# Patient Record
Sex: FEMALE | Race: WHITE | NOT HISPANIC OR LATINO | Employment: OTHER | URBAN - METROPOLITAN AREA
[De-identification: names, ages, dates, MRNs, and addresses within clinical notes are randomized per-mention and may not be internally consistent; named-entity substitution may affect disease eponyms.]

---

## 2017-01-30 ENCOUNTER — OFFICE VISIT (OUTPATIENT)
Dept: INTERNAL MEDICINE | Facility: CLINIC | Age: 77
End: 2017-01-30
Payer: MEDICARE

## 2017-01-30 VITALS
HEART RATE: 77 BPM | SYSTOLIC BLOOD PRESSURE: 166 MMHG | BODY MASS INDEX: 33.28 KG/M2 | OXYGEN SATURATION: 97 % | DIASTOLIC BLOOD PRESSURE: 80 MMHG | WEIGHT: 219.56 LBS | HEIGHT: 68 IN | TEMPERATURE: 97 F

## 2017-01-30 DIAGNOSIS — Z12.31 SCREENING MAMMOGRAM, ENCOUNTER FOR: Primary | ICD-10-CM

## 2017-01-30 DIAGNOSIS — J01.90 ACUTE SINUSITIS, RECURRENCE NOT SPECIFIED, UNSPECIFIED LOCATION: Primary | ICD-10-CM

## 2017-01-30 PROCEDURE — 99214 OFFICE O/P EST MOD 30 MIN: CPT | Mod: 25,S$GLB,, | Performed by: NURSE PRACTITIONER

## 2017-01-30 PROCEDURE — 99999 PR PBB SHADOW E&M-EST. PATIENT-LVL III: CPT | Mod: PBBFAC,,, | Performed by: NURSE PRACTITIONER

## 2017-01-30 PROCEDURE — 96372 THER/PROPH/DIAG INJ SC/IM: CPT | Mod: S$GLB,,, | Performed by: NURSE PRACTITIONER

## 2017-01-30 RX ORDER — METHYLPREDNISOLONE ACETATE 40 MG/ML
40 INJECTION, SUSPENSION INTRA-ARTICULAR; INTRALESIONAL; INTRAMUSCULAR; SOFT TISSUE
Status: COMPLETED | OUTPATIENT
Start: 2017-01-30 | End: 2017-01-30

## 2017-01-30 RX ORDER — DOXYCYCLINE 100 MG/1
100 CAPSULE ORAL 2 TIMES DAILY
Qty: 14 CAPSULE | Refills: 0 | Status: SHIPPED | OUTPATIENT
Start: 2017-01-30 | End: 2017-02-06

## 2017-01-30 RX ADMIN — METHYLPREDNISOLONE ACETATE 40 MG: 40 INJECTION, SUSPENSION INTRA-ARTICULAR; INTRALESIONAL; INTRAMUSCULAR; SOFT TISSUE at 12:01

## 2017-01-30 NOTE — PROGRESS NOTES
Subjective:       Patient ID: Grazyna Mccoy is a 76 y.o. female.    Chief Complaint: Cough and Nasal Congestion    Cough   This is a new problem. The current episode started 1 to 4 weeks ago. The problem has been gradually worsening. The problem occurs every few minutes. The cough is productive of sputum. Associated symptoms include chills, ear pain (bilateral), headaches, nasal congestion, rhinorrhea and a sore throat. Pertinent negatives include no fever, myalgias, rash or shortness of breath. Nothing aggravates the symptoms. Treatments tried: essential oils. The treatment provided mild relief.     Review of Systems   Constitutional: Positive for chills. Negative for fever.   HENT: Positive for congestion, ear pain (bilateral), rhinorrhea, sinus pressure and sore throat.    Respiratory: Positive for cough. Negative for shortness of breath.    Musculoskeletal: Negative for myalgias.   Skin: Negative for rash.   Neurological: Positive for headaches.       Objective:      Physical Exam   Constitutional: She is oriented to person, place, and time. She appears well-developed and well-nourished. No distress.   HENT:   Head: Normocephalic and atraumatic.   Right Ear: External ear normal.   Left Ear: External ear normal.   Mouth/Throat: Oropharynx is clear and moist.   Beefy red appearance to nasal mucosa with yellow discharge.   Eyes: Conjunctivae and EOM are normal. Pupils are equal, round, and reactive to light.   Neck: Normal range of motion. Neck supple.   Cardiovascular: Normal rate and regular rhythm.  Exam reveals no gallop and no friction rub.    No murmur heard.  Pulmonary/Chest: Effort normal and breath sounds normal. No respiratory distress. She has no wheezes. She has no rales.   Lymphadenopathy:     She has no cervical adenopathy.   Neurological: She is alert and oriented to person, place, and time.   Skin: Skin is warm and dry. No rash noted.   Vitals reviewed.      Assessment:       1. Acute sinusitis,  recurrence not specified, unspecified location        Plan:   Acute sinusitis, recurrence not specified, unspecified location  -     doxycycline (VIBRAMYCIN) 100 MG Cap; Take 1 capsule (100 mg total) by mouth 2 (two) times daily.  Dispense: 14 capsule; Refill: 0  -     methylPREDNISolone acetate injection 40 mg; Inject 1 mL (40 mg total) into the muscle one time.      Return if symptoms worsen or fail to improve, for keep routine follow up.

## 2017-02-08 ENCOUNTER — HOSPITAL ENCOUNTER (OUTPATIENT)
Dept: RADIOLOGY | Facility: HOSPITAL | Age: 77
Discharge: HOME | End: 2017-02-08
Attending: NURSE PRACTITIONER
Payer: MEDICARE

## 2017-02-08 DIAGNOSIS — Z12.31 SCREENING MAMMOGRAM, ENCOUNTER FOR: ICD-10-CM

## 2017-02-08 PROCEDURE — 77067 SCR MAMMO BI INCL CAD: CPT | Mod: TC

## 2017-02-08 PROCEDURE — 77063 BREAST TOMOSYNTHESIS BI: CPT | Mod: 26,,, | Performed by: RADIOLOGY

## 2017-02-08 PROCEDURE — 77067 SCR MAMMO BI INCL CAD: CPT | Mod: 26,,, | Performed by: RADIOLOGY

## 2017-03-07 DIAGNOSIS — E11.9 TYPE II OR UNSPECIFIED TYPE DIABETES MELLITUS WITHOUT MENTION OF COMPLICATION, NOT STATED AS UNCONTROLLED: ICD-10-CM

## 2017-03-10 DIAGNOSIS — E11.9 TYPE 2 DIABETES MELLITUS WITHOUT COMPLICATION: ICD-10-CM

## 2017-03-10 NOTE — TELEPHONE ENCOUNTER
----- Message from Jimi Hollins sent at 3/10/2017 12:43 PM CST -----  Contact: Taran RUDOLPH [phone]  Please call Taran regarding pt's new prescription. They are requesting it sent via e-scribe as they are unclear as to what script needs processing. Please call back @ [phone]. Thank you/NH

## 2017-04-13 ENCOUNTER — OFFICE VISIT (OUTPATIENT)
Dept: INTERNAL MEDICINE | Facility: CLINIC | Age: 77
End: 2017-04-13
Payer: MEDICARE

## 2017-04-13 ENCOUNTER — HOSPITAL ENCOUNTER (OUTPATIENT)
Dept: RADIOLOGY | Facility: HOSPITAL | Age: 77
Discharge: HOME | End: 2017-04-13
Attending: FAMILY MEDICINE
Payer: MEDICARE

## 2017-04-13 VITALS
OXYGEN SATURATION: 97 % | HEART RATE: 70 BPM | SYSTOLIC BLOOD PRESSURE: 130 MMHG | BODY MASS INDEX: 34.12 KG/M2 | WEIGHT: 217.38 LBS | HEIGHT: 67 IN | DIASTOLIC BLOOD PRESSURE: 78 MMHG | TEMPERATURE: 98 F

## 2017-04-13 DIAGNOSIS — R05.3 PERSISTENT COUGH FOR 3 WEEKS OR LONGER: ICD-10-CM

## 2017-04-13 DIAGNOSIS — J32.8 OTHER CHRONIC SINUSITIS: Primary | ICD-10-CM

## 2017-04-13 PROCEDURE — 99214 OFFICE O/P EST MOD 30 MIN: CPT | Mod: S$GLB,,, | Performed by: FAMILY MEDICINE

## 2017-04-13 PROCEDURE — 71020 XR CHEST PA AND LATERAL: CPT | Mod: TC

## 2017-04-13 PROCEDURE — 99999 PR PBB SHADOW E&M-EST. PATIENT-LVL III: CPT | Mod: PBBFAC,,, | Performed by: FAMILY MEDICINE

## 2017-04-13 PROCEDURE — 71020 XR CHEST PA AND LATERAL: CPT | Mod: 26,,, | Performed by: RADIOLOGY

## 2017-04-13 NOTE — MR AVS SNAPSHOT
[facility]  [address] [facility] [address]  Phone: [phone]  Fax: [phone]                  Grazyna Mccoy   2017 11:40 AM   Office Visit    Description:  Female : 1940   Provider:  Shobha Swift MD   Department:  [facility] - Internal Medicine           Reason for Visit     Eye Problem     Nasal Congestion     Tinnitus           Diagnoses this Visit        Comments    Other chronic sinusitis    -  Primary     Persistent cough for 3 weeks or longer                To Do List           Future Appointments        Provider Department Dept Phone    2017 1:15 PM ON XR1- [facility] [phone]    2017 10:20 AM Evie Sifuentes PA-C [facility] - Otohinolaryngology [phone]    2017 9:40 AM [facility] [phone]    2017 10:45 AM MARCELO Marx OD [facility] - Ophthalmology [phone]      Goals (5 Years of Data)     None      Follow-Up and Disposition     Return if symptoms worsen or fail to improve, for keep routine follow up.      [facility]     [facility] Nurse Care Line -  Assistance  Unless otherwise directed by your provider, please contact [facility], our nurse care line that is available for  assistance.     Registered nurses in the [facility] provide: appointment scheduling, clinical advisement, health education, and other advisory services.  Call: [phone] (toll free)               Medications           Message regarding Medications     Verify the changes and/or additions to your medication regime listed below are the same as discussed with your clinician today.  If any of these changes or additions are incorrect, please notify your healthcare provider.        STOP taking these medications     cyanocobalamin (VITAMIN B-12) 1000 MCG tablet Take 1 tablet (1,000 mcg total) by mouth once daily.           Verify that the below list  "of medications is an accurate representation of the medications you are currently taking.  If none reported, the list may be blank. If incorrect, please contact your healthcare provider. Carry this list with you in case of emergency.           Current Medications     aspirin 81 MG Chew Take by mouth. 1 tablet, chewable Oral Every day    cholecalciferol, vitamin D3, (VITAMIN D3) 1,000 unit capsule Take 1 capsule (1,000 Units total) by mouth once daily.    hydrochlorothiazide (HYDRODIURIL) 25 MG tablet TAKE ONE TABLET BY MOUTH EVERY DAY    lorazepam (ATIVAN) 0.5 MG tablet Take by mouth. 2 tablet Oral Every day    losartan (COZAAR) 50 MG tablet Take 50 mg by mouth once daily.    ONETOUCH DELICA LANCETS 33 gauge Misc CHECK BLOOD SUGAR ONCE DAILY    ONETOUCH ULTRA TEST Strp USE TO TEST TWICE A DAY    sertraline (ZOLOFT) 100 MG tablet Take 150 mg by mouth once daily.     mirtazapine (REMERON) 15 MG tablet Take by mouth. 1 tablet Oral At bedtime           Clinical Reference Information           Your Vitals Were     BP Pulse Temp Height Weight SpO2    130/78 (BP Location: Left arm, Patient Position: Sitting, BP Method: Manual) 70 97.8 °F (36.6 °C) 5' 7" (1.702 m) 98.6 kg (217 lb 6 oz) 97%    BMI                34.05 kg/m2          Blood Pressure          Most Recent Value    BP  130/78      Allergies as of 4/13/2017     Ciprofloxacin    Codeine    Septra  [Sulfamethoxazole-trimethoprim]      Immunizations Administered on Date of Encounter - 4/13/2017     None      Orders Placed During Today's Visit      Normal Orders This Visit    Ambulatory referral to ENT     Future Labs/Procedures Expected by Expires    X-Ray Chest PA And Lateral  4/13/2017 4/13/2018      Language Assistance Services     ATTENTION: Language assistance services are available, free of charge. Please call [phone].      ATENCIÓN: Si arlinela jim, tiene a alonzo disposición servicios gratuitos de asistencia lingüística. Llame al [phone].     [facility] " Ý: N?u b?n nói Ti?ng Vi?t, có các d?ch v? h? tr? ngôn ng? mi?n phí dành cho b?n. G?i s? [phone].         [facility] complies with applicable Federal civil rights laws and does not discriminate on the basis of race, color, national origin, age, disability, or sex.

## 2017-04-17 ENCOUNTER — OFFICE VISIT (OUTPATIENT)
Dept: OTOLARYNGOLOGY | Facility: CLINIC | Age: 77
End: 2017-04-17
Payer: MEDICARE

## 2017-04-17 VITALS
SYSTOLIC BLOOD PRESSURE: 156 MMHG | BODY MASS INDEX: 33.94 KG/M2 | DIASTOLIC BLOOD PRESSURE: 80 MMHG | WEIGHT: 216.69 LBS | HEART RATE: 75 BPM

## 2017-04-17 DIAGNOSIS — R05.9 COUGH: Primary | ICD-10-CM

## 2017-04-17 PROCEDURE — 99204 OFFICE O/P NEW MOD 45 MIN: CPT | Mod: S$GLB,,, | Performed by: PHYSICIAN ASSISTANT

## 2017-04-17 PROCEDURE — 99999 PR PBB SHADOW E&M-EST. PATIENT-LVL III: CPT | Mod: PBBFAC,,, | Performed by: PHYSICIAN ASSISTANT

## 2017-04-17 RX ORDER — FLUTICASONE PROPIONATE 50 MCG
2 SPRAY, SUSPENSION (ML) NASAL DAILY
Qty: 1 BOTTLE | Refills: 12 | Status: SHIPPED | OUTPATIENT
Start: 2017-04-17 | End: 2019-09-03

## 2017-04-17 NOTE — PROGRESS NOTES
Subjective:       Patient ID: Grazyna Mccoy is a 76 y.o. female.    Chief Complaint: Eye Problem; Nasal Congestion (chest she states that she would like chest xray becouse this has been estefany g on since janurary); and Tinnitus    HPI Comments: Patient presents to clinic today reporting congestion since January with cough that is productive of brown/green sputum. She was treated for sinusitis in September and January. She reports her eyes being itchy, red at times. Right greater than left. She denies tinnitus recently, but has had this in the past. She reports chest discomfort with coughing. Denies fever, chills, SOB.    Review of Systems   Constitutional: Negative for chills, fatigue, fever and unexpected weight change.   HENT: Positive for tinnitus.    Eyes: Positive for discharge, redness and itching. Negative for visual disturbance.   Respiratory: Positive for cough. Negative for shortness of breath.    Cardiovascular: Positive for chest pain.   Musculoskeletal: Negative for myalgias.   Neurological: Negative for headaches.       Objective:      Physical Exam   Constitutional: She is oriented to person, place, and time. She appears well-developed and well-nourished. No distress.   HENT:   Head: Normocephalic and atraumatic.   Right Ear: Tympanic membrane and ear canal normal.   Left Ear: Tympanic membrane and ear canal normal.   Nose: Mucosal edema and rhinorrhea present.   Mouth/Throat: Oropharynx is clear and moist and mucous membranes are normal.   Erythematous appearance to nasal mucosa   Eyes: Conjunctivae and EOM are normal. Pupils are equal, round, and reactive to light.   Neck: Normal range of motion. Neck supple.   Cardiovascular: Normal rate and regular rhythm.  Exam reveals no gallop and no friction rub.    No murmur heard.  Pulmonary/Chest: Effort normal and breath sounds normal. No respiratory distress. She has no wheezes. She has no rales.   Lymphadenopathy:     She has no cervical adenopathy.    Neurological: She is alert and oriented to person, place, and time.   Skin: Skin is warm and dry. No rash noted.   Vitals reviewed.      Assessment:       1. Other chronic sinusitis    2. Persistent cough for 3 weeks or longer        Plan:   Grazyna was seen today for eye problem, nasal congestion and tinnitus.    Diagnoses and all orders for this visit:    Other chronic sinusitis  -     Ambulatory referral to ENT    Persistent cough for 3 weeks or longer  -     X-Ray Chest PA And Lateral; Future    - no abnormality noted of eyes today, advised she can use warm compresses if symptoms recur, follow up if worse/persistent.

## 2017-04-17 NOTE — PROGRESS NOTES
Subjective:       Patient ID: Grazyna Mccoy is a 76 y.o. female.    Chief Complaint: Sinus Problem and Cough    HPI Comments: Patient is a very pleasant 76 year old female here to see me today for the first time for evaluation of cough.  She reports that she's been coughing on and off since January 2017.  At times, it's productive; currently it's a dry cough.  Worse after eating and if she drinks cold liquids.  She sometimes seems hoarse as well.  Denies heartburn or reflux.  Denies nasal drainage currently but does have a tickle in her throat at times.  She says her nose seems stuffy at times.  Denies facial pain or pressure.  Denies ear pain or drainage.  She has history of Meniere's but says no vertigo in about 5 years; previously seen by Dr. Benitez.  Has known hearing loss AD; denies recent changes.  She has occasional headaches but says nothing severe.  Denies recent fever.  Not currently taking any allergy medications or nasal sprays.  She was treated for sinusitis in 9/2016 and 1/2017 - with Doxycycline and steroid injections.  She had a CXR last week which was clear.  Denies dysphagia with solids or liquids.    Review of Systems   Constitutional: Negative for activity change, appetite change and fever.   HENT: Positive for hearing loss (known AD), postnasal drip, sore throat (occasional) and voice change (hoarse sometimes). Negative for congestion, ear discharge, ear pain, nosebleeds, rhinorrhea, sinus pressure, sneezing, tinnitus and trouble swallowing.    Eyes: Negative for discharge and itching.   Respiratory: Positive for cough. Negative for shortness of breath and wheezing.    Cardiovascular: Negative for chest pain and palpitations.   Gastrointestinal: Negative for diarrhea, nausea and vomiting.   Musculoskeletal: Positive for arthralgias (shoulder). Negative for neck pain.   Allergic/Immunologic: Positive for food allergies (milk allergy). Negative for environmental allergies.   Neurological:  Positive for dizziness (hx of Meniere's) and headaches (occasional). Negative for light-headedness.   Hematological: Negative for adenopathy.   Psychiatric/Behavioral: Negative for sleep disturbance.       Objective:      Physical Exam   Constitutional: She is oriented to person, place, and time. She appears well-developed and well-nourished. No distress.   HENT:   Head: Normocephalic and atraumatic.   Right Ear: Tympanic membrane, external ear and ear canal normal. No middle ear effusion.   Left Ear: Tympanic membrane, external ear and ear canal normal.  No middle ear effusion.   Nose: Mucosal edema and rhinorrhea (clear) present. No nasal deformity or septal deviation. No epistaxis. Right sinus exhibits no maxillary sinus tenderness and no frontal sinus tenderness. Left sinus exhibits no maxillary sinus tenderness and no frontal sinus tenderness.   Mouth/Throat: Uvula is midline, oropharynx is clear and moist and mucous membranes are normal. Mucous membranes are not pale and not dry. No trismus in the jaw. Normal dentition. No oropharyngeal exudate or posterior oropharyngeal erythema. Tonsils are 1+ on the right. Tonsils are 1+ on the left. No tonsillar exudate.   Eyes: Conjunctivae, EOM and lids are normal. Pupils are equal, round, and reactive to light. Right eye exhibits no chemosis. Left eye exhibits no chemosis. Right conjunctiva is not injected. Left conjunctiva is not injected. No scleral icterus. Right eye exhibits normal extraocular motion and no nystagmus. Left eye exhibits normal extraocular motion and no nystagmus.   Wears eyeglasses   Neck: Trachea normal and phonation normal. No tracheal tenderness present. No tracheal deviation present. No thyroid mass and no thyromegaly present.   Cardiovascular: Intact distal pulses.    Pulmonary/Chest: Effort normal. No stridor. No respiratory distress.   Abdominal: She exhibits no distension.   Lymphadenopathy:        Head (right side): No submental, no  submandibular, no preauricular, no posterior auricular and no occipital adenopathy present.        Head (left side): No submental, no submandibular, no preauricular, no posterior auricular and no occipital adenopathy present.     She has no cervical adenopathy.   Neurological: She is alert and oriented to person, place, and time. No cranial nerve deficit.   Skin: Skin is warm and dry. No rash noted. No erythema.   Psychiatric: She has a normal mood and affect. Her behavior is normal.       Assessment:       1. Cough        Plan:           She has frequent throat clearing during my exam today, possibly related to postnasal drip.  Will treat with Flonase.   The patient was given a prescription for a steroid nasal spray, and we discussed in detail the proper mechanism of use directing the spray away from the nasal septum.  In addition, we also discussed that it will take two to three weeks of daily use to achieve maximal effectiveness.   She does not want to add an antihistamine at this point as she's nervous it may exacerbate her Meniere's.  Recommend she return to clinic in 3-4 weeks with Dr. Garcia for recheck and likely scope examination if cough persists.  Discussed symptoms of reflux and reflux precautions, as well as lifestyle modifications to decrease reflux.  She is hesitant to start any reflux medication as she denies heartburn and says she's been drinking alkaline water which is supposed to help with reflux.

## 2017-04-17 NOTE — MR AVS SNAPSHOT
[facility]hin[facility]  [address] [facility] [address]  Phone: [phone]  Fax: [phone]                  Grazyna Mccoy   2017 10:20 AM   Office Visit    Description:  Female : 1940   Provider:  Evie Sifuentes PA-C   Department:  [facility] O[facility]           Reason for Visit     Sinus Problem     Cough                To Do List           Future Appointments        Provider Department Dept Phone    5/15/2017 10:15 AM [facility] [phone]    2017 9:40 AM Shobha Swift MD [facility] - Internal Medicine [phone]    2017 10:45 AM [facility] [phone]      Goals (5 Years of Data)     None       These Medications        Disp Refills Start End    fluticasone (FLONASE) 50 mcg/actuation nasal spray 1 Bottle 12 2017     2 sprays by Each Nare route once daily. - Each Nare    Pharmacy: [facility] - [address] #: [phone]         [facility]     [facility] On Call Nurse Care Line - 24 Assistance  Unless otherwise directed by your provider, please contact [facility], our nurse care line that is available for 24/7 assistance.     Registered nurses in the [facility] provide: appointment scheduling, clinical advisement, health education, and other advisory services.  Call: [phone] (toll free)               Medications           Message regarding Medications     Verify the changes and/or additions to your medication regime listed below are the same as discussed with your clinician today.  If any of these changes or additions are incorrect, please notify your healthcare provider.        START taking these NEW medications        Refills    fluticasone (FLONASE) 50 mcg/actuation nasal spray 12    Si sprays by Each Nare route once daily.    Class: Normal    Route: Each Nare           Verify that the below  list of medications is an accurate representation of the medications you are currently taking.  If none reported, the list may be blank. If incorrect, please contact your healthcare provider. Carry this list with you in case of emergency.           Current Medications     aspirin 81 MG Chew Take by mouth. 1 tablet, chewable Oral Every day    cholecalciferol, vitamin D3, (VITAMIN D3) 1,000 unit capsule Take 1 capsule (1,000 Units total) by mouth once daily.    hydrochlorothiazide (HYDRODIURIL) 25 MG tablet TAKE ONE TABLET BY MOUTH EVERY DAY    lorazepam (ATIVAN) 0.5 MG tablet Take by mouth. 2 tablet Oral Every day    losartan (COZAAR) 50 MG tablet Take 50 mg by mouth once daily.    mirtazapine (REMERON) 15 MG tablet Take by mouth. 1 tablet Oral At bedtime    ONETOUCH DELICA LANCETS 33 gauge Misc CHECK BLOOD SUGAR ONCE DAILY    ONETOUCH ULTRA TEST Strp USE TO TEST TWICE A DAY    sertraline (ZOLOFT) 100 MG tablet Take 150 mg by mouth once daily.     fluticasone (FLONASE) 50 mcg/actuation nasal spray 2 sprays by Each Nare route once daily.           Clinical Reference Information           Your Vitals Were     BP Pulse Weight BMI       156/80 75 98.3 kg (216 lb 11.4 oz) 33.94 kg/m2       Blood Pressure          Most Recent Value    BP  (!)  156/80      Allergies as of 4/17/2017     Ciprofloxacin    Codeine    Septra  [Sulfamethoxazole-trimethoprim]      Immunizations Administered on Date of Encounter - 4/17/2017     None      Language Assistance Services     ATTENTION: Language assistance services are available, free of charge. Please call [phone].      ATENCIÓN: Si arlinela jim, tiene a alonzo disposición servicios gratuitos de asistencia lingüística. Llame al [phone].     CHÚ Ý: N?u b?n nói Ti?ng Vi?t, có các d?ch v? h? tr? ngôn ng? mi?n phí dành cho b?n. G?i s? [phone].         [facility] complies with applicable Federal civil rights laws and does not discriminate on the basis of  race, color, national origin, age, disability, or sex.

## 2017-04-17 NOTE — LETTER
April 17, 2017      Shobha Swift MD  [address] [place] [address] [address] [place] [id number]           [facility]  [address] [place] [address] [address]ie [place] [address]  Phone: [phone]  Fax: [phone]          Patient: Grazyna Mccoy   MR Number: [id number]   YOB: 1940   Date of Visit: 4/17/2017       Dear Dr. Shobha Swift:    Thank you for referring Grazyna Mccoy to me for evaluation. Attached you will find relevant portions of my assessment and plan of care.    If you have questions, please do not hesitate to call me. I look forward to following Grazyna Mccoy along with you.    Sincerely,    Evie Sifuentes PA-C    Enclosure  CC:  No Recipients    If you would like to receive this communication electronically, please contact [email or web address] or [phone] to request more information on [employer] access.    For providers and/or their staff who would like to refer a patient to [facility], please contact us through our one-stop-shop provider referral line, [facility] , at [phone].    If you feel you have received this communication in error or would no longer like to receive these types of communications, please e-mail [email or web address]

## 2017-04-22 ENCOUNTER — PATIENT MESSAGE (OUTPATIENT)
Dept: INTERNAL MEDICINE | Facility: CLINIC | Age: 77
End: 2017-04-22

## 2017-04-23 NOTE — TELEPHONE ENCOUNTER
Please check with patient to see if she has ever seen Cardiology for heart enlargement that was seen on CXR? Thank you.

## 2017-05-17 ENCOUNTER — TELEPHONE (OUTPATIENT)
Dept: INTERNAL MEDICINE | Facility: CLINIC | Age: 77
End: 2017-05-17

## 2017-05-17 NOTE — TELEPHONE ENCOUNTER
----- Message from Alysha Graham sent at 5/17/2017  1:33 PM CDT -----  Contact: Patient  Patient is requesting labs before her appt on 6/12/17, but there are no orders, please call patient back at [phone]. Thank you

## 2017-06-08 ENCOUNTER — LAB VISIT (OUTPATIENT)
Dept: LAB | Facility: HOSPITAL | Age: 77
End: 2017-06-08
Attending: FAMILY MEDICINE
Payer: MEDICARE

## 2017-06-08 DIAGNOSIS — I15.2 HYPERTENSION ASSOCIATED WITH DIABETES: ICD-10-CM

## 2017-06-08 DIAGNOSIS — E11.9 TYPE 2 DIABETES MELLITUS WITHOUT COMPLICATION: ICD-10-CM

## 2017-06-08 DIAGNOSIS — E11.9 DIABETES MELLITUS TYPE 2, DIET-CONTROLLED: ICD-10-CM

## 2017-06-08 DIAGNOSIS — E53.8 VITAMIN B12 DEFICIENCY: ICD-10-CM

## 2017-06-08 DIAGNOSIS — E55.9 VITAMIN D DEFICIENCY: ICD-10-CM

## 2017-06-08 DIAGNOSIS — E11.59 HYPERTENSION ASSOCIATED WITH DIABETES: ICD-10-CM

## 2017-06-08 LAB
25(OH)D3+25(OH)D2 SERPL-MCNC: 32 NG/ML
ANION GAP SERPL CALC-SCNC: 10 MMOL/L
BASOPHILS # BLD AUTO: 0.02 K/UL
BASOPHILS NFR BLD: 0.3 %
BUN SERPL-MCNC: 14 MG/DL
CALCIUM SERPL-MCNC: 9.2 MG/DL
CHLORIDE SERPL-SCNC: 97 MMOL/L
CHOLEST/HDLC SERPL: 3.7 {RATIO}
CO2 SERPL-SCNC: 26 MMOL/L
CREAT SERPL-MCNC: 0.8 MG/DL
CREAT UR-MCNC: 103 MG/DL
DIFFERENTIAL METHOD: ABNORMAL
EOSINOPHIL # BLD AUTO: 0.1 K/UL
EOSINOPHIL NFR BLD: 1.7 %
ERYTHROCYTE [DISTWIDTH] IN BLOOD BY AUTOMATED COUNT: 13.3 %
EST. GFR  (AFRICAN AMERICAN): >60 ML/MIN/1.73 M^2
EST. GFR  (NON AFRICAN AMERICAN): >60 ML/MIN/1.73 M^2
GLUCOSE SERPL-MCNC: 122 MG/DL
HCT VFR BLD AUTO: 38.7 %
HDL/CHOLESTEROL RATIO: 27.4 %
HDLC SERPL-MCNC: 179 MG/DL
HDLC SERPL-MCNC: 49 MG/DL
HGB BLD-MCNC: 13 G/DL
LDLC SERPL CALC-MCNC: 112 MG/DL
LYMPHOCYTES # BLD AUTO: 1.8 K/UL
LYMPHOCYTES NFR BLD: 26.8 %
MCH RBC QN AUTO: 29 PG
MCHC RBC AUTO-ENTMCNC: 33.6 %
MCV RBC AUTO: 86 FL
MICROALBUMIN UR DL<=1MG/L-MCNC: 13 UG/ML
MICROALBUMIN/CREATININE RATIO: 12.6 UG/MG
MONOCYTES # BLD AUTO: 0.6 K/UL
MONOCYTES NFR BLD: 9.3 %
NEUTROPHILS # BLD AUTO: 4.1 K/UL
NEUTROPHILS NFR BLD: 61.7 %
NONHDLC SERPL-MCNC: 130 MG/DL
PLATELET # BLD AUTO: 368 K/UL
PMV BLD AUTO: 10 FL
POTASSIUM SERPL-SCNC: 4 MMOL/L
RBC # BLD AUTO: 4.48 M/UL
SODIUM SERPL-SCNC: 133 MMOL/L
TRIGL SERPL-MCNC: 90 MG/DL
TSH SERPL DL<=0.005 MIU/L-ACNC: 2.71 UIU/ML
VIT B12 SERPL-MCNC: 485 PG/ML
WBC # BLD AUTO: 6.57 K/UL

## 2017-06-08 PROCEDURE — 82306 VITAMIN D 25 HYDROXY: CPT

## 2017-06-08 PROCEDURE — 36415 COLL VENOUS BLD VENIPUNCTURE: CPT

## 2017-06-08 PROCEDURE — 83036 HEMOGLOBIN GLYCOSYLATED A1C: CPT

## 2017-06-08 PROCEDURE — 85025 COMPLETE CBC W/AUTO DIFF WBC: CPT

## 2017-06-08 PROCEDURE — 80061 LIPID PANEL: CPT

## 2017-06-08 PROCEDURE — 82570 ASSAY OF URINE CREATININE: CPT

## 2017-06-08 PROCEDURE — 84443 ASSAY THYROID STIM HORMONE: CPT

## 2017-06-08 PROCEDURE — 82607 VITAMIN B-12: CPT

## 2017-06-08 PROCEDURE — 80048 BASIC METABOLIC PNL TOTAL CA: CPT

## 2017-06-09 LAB
ESTIMATED AVG GLUCOSE: 151 MG/DL
HBA1C MFR BLD HPLC: 6.9 %

## 2017-06-12 ENCOUNTER — LAB VISIT (OUTPATIENT)
Dept: LAB | Facility: HOSPITAL | Age: 77
End: 2017-06-12
Attending: FAMILY MEDICINE
Payer: MEDICARE

## 2017-06-12 ENCOUNTER — OFFICE VISIT (OUTPATIENT)
Dept: INTERNAL MEDICINE | Facility: CLINIC | Age: 77
End: 2017-06-12
Payer: MEDICARE

## 2017-06-12 VITALS
WEIGHT: 218.5 LBS | TEMPERATURE: 97 F | DIASTOLIC BLOOD PRESSURE: 68 MMHG | OXYGEN SATURATION: 97 % | SYSTOLIC BLOOD PRESSURE: 139 MMHG | HEIGHT: 67 IN | HEART RATE: 77 BPM | BODY MASS INDEX: 34.29 KG/M2

## 2017-06-12 DIAGNOSIS — E11.9 DIABETES MELLITUS TYPE 2, DIET-CONTROLLED: ICD-10-CM

## 2017-06-12 DIAGNOSIS — E78.5 HYPERLIPIDEMIA ASSOCIATED WITH TYPE 2 DIABETES MELLITUS: ICD-10-CM

## 2017-06-12 DIAGNOSIS — Z78.9 STATIN INTOLERANCE: ICD-10-CM

## 2017-06-12 DIAGNOSIS — E87.1 HYPONATREMIA: Primary | ICD-10-CM

## 2017-06-12 DIAGNOSIS — I15.2 HYPERTENSION ASSOCIATED WITH DIABETES: ICD-10-CM

## 2017-06-12 DIAGNOSIS — E11.69 HYPERLIPIDEMIA ASSOCIATED WITH TYPE 2 DIABETES MELLITUS: ICD-10-CM

## 2017-06-12 DIAGNOSIS — E11.59 HYPERTENSION ASSOCIATED WITH DIABETES: ICD-10-CM

## 2017-06-12 LAB
ALBUMIN SERPL BCP-MCNC: 3.7 G/DL
ALP SERPL-CCNC: 86 U/L
ALT SERPL W/O P-5'-P-CCNC: 15 U/L
ANION GAP SERPL CALC-SCNC: 8 MMOL/L
AST SERPL-CCNC: 22 U/L
BILIRUB SERPL-MCNC: 0.4 MG/DL
BUN SERPL-MCNC: 11 MG/DL
CALCIUM SERPL-MCNC: 9.3 MG/DL
CHLORIDE SERPL-SCNC: 100 MMOL/L
CO2 SERPL-SCNC: 28 MMOL/L
CREAT SERPL-MCNC: 0.8 MG/DL
EST. GFR  (AFRICAN AMERICAN): >60 ML/MIN/1.73 M^2
EST. GFR  (NON AFRICAN AMERICAN): >60 ML/MIN/1.73 M^2
GLUCOSE SERPL-MCNC: 110 MG/DL
POTASSIUM SERPL-SCNC: 4.4 MMOL/L
PROT SERPL-MCNC: 7.4 G/DL
SODIUM SERPL-SCNC: 136 MMOL/L

## 2017-06-12 PROCEDURE — 3044F HG A1C LEVEL LT 7.0%: CPT | Mod: S$GLB,,, | Performed by: FAMILY MEDICINE

## 2017-06-12 PROCEDURE — 99999 PR PBB SHADOW E&M-EST. PATIENT-LVL III: CPT | Mod: PBBFAC,,, | Performed by: FAMILY MEDICINE

## 2017-06-12 PROCEDURE — 1126F AMNT PAIN NOTED NONE PRSNT: CPT | Mod: S$GLB,,, | Performed by: FAMILY MEDICINE

## 2017-06-12 PROCEDURE — 80053 COMPREHEN METABOLIC PANEL: CPT

## 2017-06-12 PROCEDURE — 36415 COLL VENOUS BLD VENIPUNCTURE: CPT

## 2017-06-12 PROCEDURE — 1159F MED LIST DOCD IN RCRD: CPT | Mod: S$GLB,,, | Performed by: FAMILY MEDICINE

## 2017-06-12 PROCEDURE — 4010F ACE/ARB THERAPY RXD/TAKEN: CPT | Mod: S$GLB,,, | Performed by: FAMILY MEDICINE

## 2017-06-12 PROCEDURE — 99214 OFFICE O/P EST MOD 30 MIN: CPT | Mod: S$GLB,,, | Performed by: FAMILY MEDICINE

## 2017-06-13 DIAGNOSIS — I15.2 HYPERTENSION ASSOCIATED WITH DIABETES: ICD-10-CM

## 2017-06-13 DIAGNOSIS — E11.59 HYPERTENSION ASSOCIATED WITH DIABETES: ICD-10-CM

## 2017-06-13 RX ORDER — BLOOD SUGAR DIAGNOSTIC
STRIP MISCELLANEOUS
Qty: 100 STRIP | Refills: 5 | Status: SHIPPED | OUTPATIENT
Start: 2017-06-13 | End: 2018-05-17 | Stop reason: SDUPTHER

## 2017-06-15 PROBLEM — E11.69 HYPERLIPIDEMIA ASSOCIATED WITH TYPE 2 DIABETES MELLITUS: Status: ACTIVE | Noted: 2017-06-15

## 2017-06-15 PROBLEM — E78.5 HYPERLIPIDEMIA ASSOCIATED WITH TYPE 2 DIABETES MELLITUS: Status: ACTIVE | Noted: 2017-06-15

## 2017-06-15 PROBLEM — Z78.9 STATIN INTOLERANCE: Status: ACTIVE | Noted: 2017-06-15

## 2017-06-16 NOTE — PROGRESS NOTES
Subjective:       Patient ID: Grazyna Mccoy is a 76 y.o. female.    Chief Complaint: Follow-up    Patient presents to clinic today for follow up of chronic conditions. Diabetes is diet controlled. Lipids are above goal, patient is unable to tolerate statins. Hypertension controlled per home BP readings on current meds. Patient reports taking Xooma water and other supplements.      Review of Systems   Constitutional: Negative for chills, fatigue, fever and unexpected weight change.   Eyes: Negative for visual disturbance.   Respiratory: Negative for shortness of breath.    Cardiovascular: Negative for chest pain.   Musculoskeletal: Negative for myalgias.   Neurological: Negative for headaches.       Objective:      Physical Exam   Constitutional: She is oriented to person, place, and time. She appears well-developed and well-nourished. No distress.   HENT:   Head: Normocephalic and atraumatic.   Eyes: Conjunctivae and EOM are normal. Pupils are equal, round, and reactive to light. No scleral icterus.   Pulmonary/Chest: Effort normal.   Neurological: She is alert and oriented to person, place, and time. No cranial nerve deficit. Gait normal.   Psychiatric: She has a normal mood and affect.   Vitals reviewed.      Assessment:       1. Hyponatremia    2. Hypertension associated with diabetes    3. Hyperlipidemia associated with type 2 diabetes mellitus    4. Diabetes mellitus type 2, diet-controlled    5. Statin intolerance        Plan:   Grazyna was seen today for follow-up.    Diagnoses and all orders for this visit:    Hyponatremia  Comments:  recheck sodium    Hypertension associated with diabetes  Comments:  controlled per home BP monitoring, continue current meds    Hyperlipidemia associated with type 2 diabetes mellitus  Comments:  , above goal, unable to tolerate statins  Orders:  -     Comprehensive metabolic panel; Future    Diabetes mellitus type 2, diet-controlled    Statin intolerance    Other  orders  -     Cancel: Comprehensive metabolic panel; Standing    - advised patient I can't recommend for or against the supplements she is taking, she expressed understanding.

## 2017-07-07 ENCOUNTER — OFFICE VISIT (OUTPATIENT)
Dept: OPHTHALMOLOGY | Facility: CLINIC | Age: 77
End: 2017-07-07
Payer: MEDICARE

## 2017-07-07 DIAGNOSIS — H52.7 REFRACTIVE ERROR: ICD-10-CM

## 2017-07-07 DIAGNOSIS — Z96.1 PSEUDOPHAKIA OF BOTH EYES: ICD-10-CM

## 2017-07-07 DIAGNOSIS — E11.9 DIABETES MELLITUS TYPE 2 WITHOUT RETINOPATHY: Primary | ICD-10-CM

## 2017-07-07 DIAGNOSIS — Z13.5 SCREENING FOR GLAUCOMA: ICD-10-CM

## 2017-07-07 PROCEDURE — 92015 DETERMINE REFRACTIVE STATE: CPT | Mod: S$GLB,,, | Performed by: OPTOMETRIST

## 2017-07-07 PROCEDURE — 99999 PR PBB SHADOW E&M-EST. PATIENT-LVL I: CPT | Mod: PBBFAC,,, | Performed by: OPTOMETRIST

## 2017-07-07 PROCEDURE — 92014 COMPRE OPH EXAM EST PT 1/>: CPT | Mod: S$GLB,,, | Performed by: OPTOMETRIST

## 2017-07-07 NOTE — PROGRESS NOTES
HPI     Last [facility] exam 07/01/2016  Diabetic/NIDDM  Pseudophakia, OU  Screening for glaucoma  RE  No visual complaints    Last edited by Robert Tomas MA on 7/7/2017 10:32 AM. (History)            Assessment /Plan     For exam results, see Encounter Report.    Diabetes mellitus type 2 without retinopathy    Pseudophakia of both eyes    Screening for glaucoma    Refractive error      No diabetic retinopathy OU.  Stable PIOL OU.  OH OK OU.  Spec Rx given.  RTC one year.

## 2017-07-11 ENCOUNTER — TELEPHONE (OUTPATIENT)
Dept: OPHTHALMOLOGY | Facility: CLINIC | Age: 77
End: 2017-07-11

## 2017-07-11 NOTE — TELEPHONE ENCOUNTER
----- Message from Ewa Sifuentes sent at 7/7/2017 12:16 PM CDT -----  Contact: pt  Would like to have a copy of eyeglass rx mailed to home address pt was seen today states she was not given copy after visit thanks

## 2017-11-10 ENCOUNTER — PATIENT MESSAGE (OUTPATIENT)
Dept: INTERNAL MEDICINE | Facility: CLINIC | Age: 77
End: 2017-11-10

## 2017-11-15 ENCOUNTER — IMMUNIZATION (OUTPATIENT)
Dept: INTERNAL MEDICINE | Facility: CLINIC | Age: 77
End: 2017-11-15
Payer: MEDICARE

## 2017-11-15 PROCEDURE — G0008 ADMIN INFLUENZA VIRUS VAC: HCPCS | Mod: S$GLB,,, | Performed by: FAMILY MEDICINE

## 2017-11-15 PROCEDURE — 90662 IIV NO PRSV INCREASED AG IM: CPT | Mod: S$GLB,,, | Performed by: FAMILY MEDICINE

## 2017-12-07 ENCOUNTER — OFFICE VISIT (OUTPATIENT)
Dept: INTERNAL MEDICINE | Facility: CLINIC | Age: 77
End: 2017-12-07
Payer: MEDICARE

## 2017-12-07 ENCOUNTER — LAB VISIT (OUTPATIENT)
Dept: LAB | Facility: HOSPITAL | Age: 77
End: 2017-12-07
Attending: FAMILY MEDICINE
Payer: MEDICARE

## 2017-12-07 VITALS
TEMPERATURE: 97 F | DIASTOLIC BLOOD PRESSURE: 86 MMHG | HEIGHT: 67 IN | SYSTOLIC BLOOD PRESSURE: 138 MMHG | WEIGHT: 218.06 LBS | HEART RATE: 83 BPM | OXYGEN SATURATION: 97 % | BODY MASS INDEX: 34.22 KG/M2

## 2017-12-07 DIAGNOSIS — E11.59 HYPERTENSION ASSOCIATED WITH DIABETES: ICD-10-CM

## 2017-12-07 DIAGNOSIS — E55.9 VITAMIN D DEFICIENCY: ICD-10-CM

## 2017-12-07 DIAGNOSIS — I15.2 HYPERTENSION ASSOCIATED WITH DIABETES: ICD-10-CM

## 2017-12-07 DIAGNOSIS — E11.9 DIABETES MELLITUS TYPE 2, DIET-CONTROLLED: ICD-10-CM

## 2017-12-07 DIAGNOSIS — E11.69 HYPERLIPIDEMIA ASSOCIATED WITH TYPE 2 DIABETES MELLITUS: ICD-10-CM

## 2017-12-07 DIAGNOSIS — E78.5 HYPERLIPIDEMIA ASSOCIATED WITH TYPE 2 DIABETES MELLITUS: ICD-10-CM

## 2017-12-07 DIAGNOSIS — J06.9 UPPER RESPIRATORY TRACT INFECTION, UNSPECIFIED TYPE: Primary | ICD-10-CM

## 2017-12-07 DIAGNOSIS — H60.322: ICD-10-CM

## 2017-12-07 LAB
25(OH)D3+25(OH)D2 SERPL-MCNC: 28 NG/ML
ALBUMIN SERPL BCP-MCNC: 3.6 G/DL
ALP SERPL-CCNC: 72 U/L
ALT SERPL W/O P-5'-P-CCNC: 10 U/L
ANION GAP SERPL CALC-SCNC: 6 MMOL/L
AST SERPL-CCNC: 20 U/L
BILIRUB SERPL-MCNC: 0.4 MG/DL
BUN SERPL-MCNC: 13 MG/DL
CALCIUM SERPL-MCNC: 9.2 MG/DL
CHLORIDE SERPL-SCNC: 104 MMOL/L
CHOLEST SERPL-MCNC: 192 MG/DL
CHOLEST/HDLC SERPL: 3.8 {RATIO}
CO2 SERPL-SCNC: 28 MMOL/L
CREAT SERPL-MCNC: 0.8 MG/DL
EST. GFR  (AFRICAN AMERICAN): >60 ML/MIN/1.73 M^2
EST. GFR  (NON AFRICAN AMERICAN): >60 ML/MIN/1.73 M^2
ESTIMATED AVG GLUCOSE: 146 MG/DL
GLUCOSE SERPL-MCNC: 139 MG/DL
HBA1C MFR BLD HPLC: 6.7 %
HDLC SERPL-MCNC: 51 MG/DL
HDLC SERPL: 26.6 %
LDLC SERPL CALC-MCNC: 118.2 MG/DL
NONHDLC SERPL-MCNC: 141 MG/DL
POTASSIUM SERPL-SCNC: 4.3 MMOL/L
PROT SERPL-MCNC: 7.3 G/DL
SODIUM SERPL-SCNC: 138 MMOL/L
TRIGL SERPL-MCNC: 114 MG/DL
TSH SERPL DL<=0.005 MIU/L-ACNC: 1.93 UIU/ML

## 2017-12-07 PROCEDURE — 84443 ASSAY THYROID STIM HORMONE: CPT

## 2017-12-07 PROCEDURE — 80053 COMPREHEN METABOLIC PANEL: CPT

## 2017-12-07 PROCEDURE — 80061 LIPID PANEL: CPT

## 2017-12-07 PROCEDURE — 36415 COLL VENOUS BLD VENIPUNCTURE: CPT

## 2017-12-07 PROCEDURE — 99214 OFFICE O/P EST MOD 30 MIN: CPT | Mod: S$GLB,,, | Performed by: PHYSICIAN ASSISTANT

## 2017-12-07 PROCEDURE — 82652 VIT D 1 25-DIHYDROXY: CPT

## 2017-12-07 PROCEDURE — 99999 PR PBB SHADOW E&M-EST. PATIENT-LVL IV: CPT | Mod: PBBFAC,,, | Performed by: PHYSICIAN ASSISTANT

## 2017-12-07 PROCEDURE — 83036 HEMOGLOBIN GLYCOSYLATED A1C: CPT

## 2017-12-07 PROCEDURE — 82306 VITAMIN D 25 HYDROXY: CPT

## 2017-12-07 RX ORDER — NEOMYCIN SULFATE, POLYMYXIN B SULFATE AND HYDROCORTISONE 10; 3.5; 1 MG/ML; MG/ML; [USP'U]/ML
3 SUSPENSION/ DROPS AURICULAR (OTIC) 4 TIMES DAILY
Qty: 10 ML | Refills: 0 | Status: SHIPPED | OUTPATIENT
Start: 2017-12-07 | End: 2019-09-03

## 2017-12-07 NOTE — PROGRESS NOTES
Subjective:       Patient ID: Grazyna Mccoy is a 77 y.o. female.    Chief Complaint: Sore Throat and Otalgia    Patient is a 76 yo female with DM 2. This condition is stable, but it put her at high risk.       Otalgia    There is pain in the left ear. This is a new problem. The current episode started yesterday. The problem occurs constantly. The problem has been unchanged. There has been no fever. The pain is moderate. Associated symptoms include a sore throat. Pertinent negatives include no abdominal pain or hearing loss. Associated symptoms comments: Had some blood from the ear after getting wax out with a q tip.     Review of Systems   Constitutional: Negative for chills, fatigue and fever.   HENT: Positive for ear pain, postnasal drip and sore throat. Negative for congestion, hearing loss and sinus pressure.    Respiratory: Negative for chest tightness and shortness of breath.    Gastrointestinal: Negative for abdominal pain.       Objective:      Physical Exam   Constitutional: She appears well-developed and well-nourished. No distress.   HENT:   Head: Normocephalic and atraumatic.   Right Ear: Tympanic membrane and ear canal normal.   Left Ear: There is swelling. Tympanic membrane is not perforated.   Ears:    Nose: No mucosal edema or rhinorrhea.   Mouth/Throat: Posterior oropharyngeal erythema present. No oropharyngeal exudate, posterior oropharyngeal edema or tonsillar abscesses.   Cardiovascular: Normal rate and regular rhythm.    Pulses:       Dorsalis pedis pulses are 1+ on the right side, and 1+ on the left side.        Posterior tibial pulses are 1+ on the right side, and 1+ on the left side.   Pulmonary/Chest: Effort normal and breath sounds normal.   Abdominal: Soft. Bowel sounds are normal.   Musculoskeletal:        Right foot: There is normal range of motion and no deformity.        Left foot: There is normal range of motion and no deformity.   Feet:   Right Foot:   Protective Sensation: 6 sites  tested. 6 sites sensed.   Skin Integrity: Positive for callus and dry skin. Negative for ulcer, blister, skin breakdown, erythema or warmth.   Left Foot:   Protective Sensation: 6 sites tested. 6 sites sensed.   Skin Integrity: Positive for callus and dry skin. Negative for ulcer, blister, skin breakdown, erythema or warmth.   Skin: She is not diaphoretic.   Nursing note and vitals reviewed.    .foot  Assessment:       1. Upper respiratory tract infection, unspecified type    2. Hemorrhagic otitis externa of left ear, unspecified chronicity    3. Hyperlipidemia associated with type 2 diabetes mellitus    4. Vitamin D deficiency        Plan:       Upper respiratory tract infection, unspecified type    Hemorrhagic otitis externa of left ear, unspecified chronicity    Hyperlipidemia associated with type 2 diabetes mellitus  -     Comprehensive metabolic panel; Future; Expected date: 12/07/2017  -     Hemoglobin A1c; Future; Expected date: 12/07/2017    Vitamin D deficiency  -     CALCITRIOL; Future; Expected date: 12/07/2017  -     Vitamin D; Future; Expected date: 12/07/2017    Other orders  -     neomycin-polymyxin-hydrocortisone (CORTISPORIN) 3.5-10,000-1 mg/mL-unit/mL-% otic suspension; Place 3 drops into the left ear 4 (four) times daily.  Dispense: 10 mL; Refill: 0

## 2017-12-07 NOTE — PATIENT INSTRUCTIONS
Continue with antibiotics until gone. May take tylenol PRN fever. Increase fluids and rest. Call the clinic if not better in 3 to 5 days. Suggest togo to the Emergency Room if symptoms get much worse. Otherwise follow up with your PCP as scheduled.

## 2017-12-11 LAB — 1,25(OH)2D3 SERPL-MCNC: 37 PG/ML

## 2017-12-12 ENCOUNTER — TELEPHONE (OUTPATIENT)
Dept: INTERNAL MEDICINE | Facility: CLINIC | Age: 77
End: 2017-12-12

## 2017-12-12 NOTE — TELEPHONE ENCOUNTER
----- Message from Art Zhong III, PA-C sent at 12/12/2017  8:14 AM CST -----  blood work appeThe Vitamin D level is still low. If Ms Mccoy is currently taking a 1000 mg of calcium daily for 3 months, she needs to increase the dose to 2000 mg daily. Will need to recheck the vitamin D in 3 months. The rest of thears stable.

## 2017-12-14 ENCOUNTER — TELEPHONE (OUTPATIENT)
Dept: INTERNAL MEDICINE | Facility: CLINIC | Age: 77
End: 2017-12-14

## 2017-12-14 NOTE — TELEPHONE ENCOUNTER
Patient notified of results and appt for follow up lab booked for March. Patient notified by phone.

## 2017-12-26 ENCOUNTER — OFFICE VISIT (OUTPATIENT)
Dept: INTERNAL MEDICINE | Facility: CLINIC | Age: 77
End: 2017-12-26
Payer: MEDICARE

## 2017-12-26 VITALS
SYSTOLIC BLOOD PRESSURE: 132 MMHG | TEMPERATURE: 98 F | BODY MASS INDEX: 34.43 KG/M2 | OXYGEN SATURATION: 98 % | DIASTOLIC BLOOD PRESSURE: 78 MMHG | HEART RATE: 71 BPM | HEIGHT: 67 IN | WEIGHT: 219.38 LBS

## 2017-12-26 DIAGNOSIS — E55.9 VITAMIN D DEFICIENCY: Primary | ICD-10-CM

## 2017-12-26 DIAGNOSIS — M25.50 ARTHRALGIA, UNSPECIFIED JOINT: ICD-10-CM

## 2017-12-26 PROCEDURE — 99999 PR PBB SHADOW E&M-EST. PATIENT-LVL III: CPT | Mod: PBBFAC,,, | Performed by: FAMILY MEDICINE

## 2017-12-26 PROCEDURE — 99213 OFFICE O/P EST LOW 20 MIN: CPT | Mod: S$GLB,,, | Performed by: FAMILY MEDICINE

## 2017-12-26 RX ORDER — DEXTROMETHORPHAN HYDROBROMIDE, GUAIFENESIN 5; 100 MG/5ML; MG/5ML
650 LIQUID ORAL EVERY 8 HOURS
Refills: 0 | COMMUNITY
Start: 2017-12-26 | End: 2019-09-03

## 2017-12-26 RX ORDER — VIT C/E/ZN/COPPR/LUTEIN/ZEAXAN 250MG-90MG
2000 CAPSULE ORAL DAILY
Refills: 0 | COMMUNITY
Start: 2017-12-26

## 2017-12-26 NOTE — PROGRESS NOTES
Subjective:       Patient ID: Grazyna Mccoy is a 77 y.o. female.    Chief Complaint: Hand Pain and Knee Pain    Patient presents to clinic today reporting joint pains, that she describes as aching. She has not taken anything for this. She reports her right 5th MCP was hurting, started about 2 weeks ago, little better now. She reports left shoulder pain, left knee pain. She states she has aches/pains all over. She has not increased her vitamin D as directed, she reports trouble accessing her [employer] account. She also reports morning BS being elevated around 140-150, but states they are back to normal in the evening, around 100. She reports usually last meal around 5 or 6 pm. Patient is otherwise without concerns today.        Review of Systems   Constitutional: Negative for chills, fatigue, fever and unexpected weight change.   Eyes: Negative for visual disturbance.   Respiratory: Negative for shortness of breath.    Cardiovascular: Negative for chest pain.   Musculoskeletal: Positive for arthralgias and myalgias.   Neurological: Negative for headaches.       Objective:      Physical Exam   Constitutional: She is oriented to person, place, and time. She appears well-developed and well-nourished. No distress.   HENT:   Head: Normocephalic and atraumatic.   Eyes: Conjunctivae and EOM are normal. Pupils are equal, round, and reactive to light. No scleral icterus.   Pulmonary/Chest: Effort normal.   Neurological: She is alert and oriented to person, place, and time. No cranial nerve deficit. Gait normal.   Psychiatric: She has a normal mood and affect.   Vitals reviewed.      Assessment:       1. Vitamin D deficiency    2. Arthralgia, unspecified joint        Plan:     Problem List Items Addressed This Visit     Vitamin D deficiency - Primary    Current Assessment & Plan     Advised to increase vitamin D to 2,000 IU daily, plan to reassess in 3 months         Relevant Medications    cholecalciferol, vitamin D3, (VITAMIN  D3) 1,000 unit capsule      Other Visit Diagnoses     Arthralgia, unspecified joint        likely arthritis, advised tylenol prn, follow up if worse/persistent    Relevant Medications    acetaminophen (TYLENOL) 650 MG TbSR        AVS printed today, given contact information for [facility].

## 2018-03-14 ENCOUNTER — LAB VISIT (OUTPATIENT)
Dept: LAB | Facility: HOSPITAL | Age: 78
End: 2018-03-14
Attending: FAMILY MEDICINE
Payer: MEDICARE

## 2018-03-14 DIAGNOSIS — E55.9 VITAMIN D DEFICIENCY: ICD-10-CM

## 2018-03-14 LAB — 25(OH)D3+25(OH)D2 SERPL-MCNC: 37 NG/ML

## 2018-03-14 PROCEDURE — 36415 COLL VENOUS BLD VENIPUNCTURE: CPT

## 2018-03-14 PROCEDURE — 82306 VITAMIN D 25 HYDROXY: CPT

## 2018-03-26 ENCOUNTER — OFFICE VISIT (OUTPATIENT)
Dept: INTERNAL MEDICINE | Facility: CLINIC | Age: 78
End: 2018-03-26
Payer: MEDICARE

## 2018-03-26 VITALS
DIASTOLIC BLOOD PRESSURE: 78 MMHG | WEIGHT: 221.31 LBS | HEIGHT: 67 IN | OXYGEN SATURATION: 97 % | TEMPERATURE: 98 F | HEART RATE: 83 BPM | BODY MASS INDEX: 34.73 KG/M2 | SYSTOLIC BLOOD PRESSURE: 126 MMHG

## 2018-03-26 DIAGNOSIS — E55.9 VITAMIN D DEFICIENCY: ICD-10-CM

## 2018-03-26 DIAGNOSIS — Z78.9 STATIN INTOLERANCE: ICD-10-CM

## 2018-03-26 DIAGNOSIS — E11.69 HYPERLIPIDEMIA ASSOCIATED WITH TYPE 2 DIABETES MELLITUS: ICD-10-CM

## 2018-03-26 DIAGNOSIS — E78.5 HYPERLIPIDEMIA ASSOCIATED WITH TYPE 2 DIABETES MELLITUS: ICD-10-CM

## 2018-03-26 DIAGNOSIS — E11.59 HYPERTENSION ASSOCIATED WITH DIABETES: Primary | ICD-10-CM

## 2018-03-26 DIAGNOSIS — E11.9 DIET-CONTROLLED TYPE 2 DIABETES MELLITUS: ICD-10-CM

## 2018-03-26 DIAGNOSIS — I15.2 HYPERTENSION ASSOCIATED WITH DIABETES: Primary | ICD-10-CM

## 2018-03-26 PROCEDURE — 3078F DIAST BP <80 MM HG: CPT | Mod: CPTII,S$GLB,, | Performed by: FAMILY MEDICINE

## 2018-03-26 PROCEDURE — 99999 PR PBB SHADOW E&M-EST. PATIENT-LVL III: CPT | Mod: PBBFAC,,, | Performed by: FAMILY MEDICINE

## 2018-03-26 PROCEDURE — 3044F HG A1C LEVEL LT 7.0%: CPT | Mod: CPTII,S$GLB,, | Performed by: FAMILY MEDICINE

## 2018-03-26 PROCEDURE — 99214 OFFICE O/P EST MOD 30 MIN: CPT | Mod: S$GLB,,, | Performed by: FAMILY MEDICINE

## 2018-03-26 PROCEDURE — 3074F SYST BP LT 130 MM HG: CPT | Mod: CPTII,S$GLB,, | Performed by: FAMILY MEDICINE

## 2018-03-26 RX ORDER — LOSARTAN POTASSIUM 50 MG/1
50 TABLET ORAL DAILY
Qty: 90 TABLET | Refills: 3 | Status: SHIPPED | OUTPATIENT
Start: 2018-03-26 | End: 2019-02-25 | Stop reason: SDUPTHER

## 2018-03-26 NOTE — ASSESSMENT & PLAN NOTE
Above goal, , discussed treatment options with patient, she does not desire to treat, reports side effects from statins in the past, offered to try on zetia, patient declines

## 2018-03-26 NOTE — PROGRESS NOTES
Subjective:       Patient ID: Grazyna Mccoy is a 77 y.o. female.    Chief Complaint: Follow-up (6month)    Patient presents to clinic today for followup of diabetes, hypertension and hyperlipidemia.       Review of Systems   Constitutional: Negative for chills, fatigue, fever and unexpected weight change.   Eyes: Negative for visual disturbance.   Respiratory: Negative for shortness of breath.    Cardiovascular: Negative for chest pain.   Musculoskeletal: Negative for myalgias.   Neurological: Negative for headaches.       Objective:      Physical Exam   Constitutional: She is oriented to person, place, and time. She appears well-developed and well-nourished. No distress.   HENT:   Head: Normocephalic and atraumatic.   Eyes: Conjunctivae and EOM are normal. Pupils are equal, round, and reactive to light. No scleral icterus.   Pulmonary/Chest: Effort normal.   Neurological: She is alert and oriented to person, place, and time. No cranial nerve deficit. Gait normal.   Psychiatric: She has a normal mood and affect.   Vitals reviewed.      Assessment:       1. Hypertension associated with diabetes    2. Hyperlipidemia associated with type 2 diabetes mellitus    3. Diet-controlled type 2 diabetes mellitus    4. Vitamin D deficiency    5. Statin intolerance        Plan:     Problem List Items Addressed This Visit     Diet-controlled type 2 diabetes mellitus    Current Assessment & Plan     Last a1c 6.7         Hypertension associated with diabetes - Primary    Current Assessment & Plan     Controlled, continue losartan         Relevant Medications    losartan (COZAAR) 50 MG tablet    Vitamin D deficiency    Current Assessment & Plan     Controlled, continue vitamin D3 2,000 IU daily         Statin intolerance    Hyperlipidemia associated with type 2 diabetes mellitus    Current Assessment & Plan     Above goal, , discussed treatment options with patient, she does not desire to treat, reports side effects from  statins in the past, offered to try on zetia, patient declines

## 2018-04-23 ENCOUNTER — HOSPITAL ENCOUNTER (OUTPATIENT)
Dept: RADIOLOGY | Facility: HOSPITAL | Age: 78
Discharge: HOME | End: 2018-04-23
Attending: FAMILY MEDICINE
Payer: MEDICARE

## 2018-04-23 ENCOUNTER — OFFICE VISIT (OUTPATIENT)
Dept: INTERNAL MEDICINE | Facility: CLINIC | Age: 78
End: 2018-04-23
Payer: MEDICARE

## 2018-04-23 VITALS
OXYGEN SATURATION: 94 % | BODY MASS INDEX: 34.22 KG/M2 | DIASTOLIC BLOOD PRESSURE: 78 MMHG | HEART RATE: 66 BPM | TEMPERATURE: 98 F | HEIGHT: 67 IN | SYSTOLIC BLOOD PRESSURE: 162 MMHG | WEIGHT: 218.06 LBS

## 2018-04-23 DIAGNOSIS — E11.9 DIET-CONTROLLED TYPE 2 DIABETES MELLITUS: ICD-10-CM

## 2018-04-23 DIAGNOSIS — J20.9 ACUTE BRONCHITIS, UNSPECIFIED ORGANISM: Primary | ICD-10-CM

## 2018-04-23 DIAGNOSIS — J20.9 ACUTE BRONCHITIS, UNSPECIFIED ORGANISM: ICD-10-CM

## 2018-04-23 PROCEDURE — 3044F HG A1C LEVEL LT 7.0%: CPT | Mod: CPTII,S$GLB,, | Performed by: FAMILY MEDICINE

## 2018-04-23 PROCEDURE — 3078F DIAST BP <80 MM HG: CPT | Mod: CPTII,S$GLB,, | Performed by: FAMILY MEDICINE

## 2018-04-23 PROCEDURE — 71046 X-RAY EXAM CHEST 2 VIEWS: CPT | Mod: 26,,, | Performed by: RADIOLOGY

## 2018-04-23 PROCEDURE — 99999 PR PBB SHADOW E&M-EST. PATIENT-LVL III: CPT | Mod: PBBFAC,,, | Performed by: FAMILY MEDICINE

## 2018-04-23 PROCEDURE — 71046 X-RAY EXAM CHEST 2 VIEWS: CPT | Mod: TC

## 2018-04-23 PROCEDURE — 99214 OFFICE O/P EST MOD 30 MIN: CPT | Mod: S$GLB,,, | Performed by: FAMILY MEDICINE

## 2018-04-23 PROCEDURE — 3077F SYST BP >= 140 MM HG: CPT | Mod: CPTII,S$GLB,, | Performed by: FAMILY MEDICINE

## 2018-04-23 RX ORDER — AMOXICILLIN AND CLAVULANATE POTASSIUM 875; 125 MG/1; MG/1
1 TABLET, FILM COATED ORAL 2 TIMES DAILY
Qty: 20 TABLET | Refills: 0 | Status: SHIPPED | OUTPATIENT
Start: 2018-04-23 | End: 2018-05-03

## 2018-04-23 RX ORDER — PREDNISONE 50 MG/1
50 TABLET ORAL DAILY
Qty: 5 TABLET | Refills: 0 | Status: SHIPPED | OUTPATIENT
Start: 2018-04-23 | End: 2018-04-28

## 2018-04-23 RX ORDER — ALBUTEROL SULFATE 90 UG/1
2 AEROSOL, METERED RESPIRATORY (INHALATION) EVERY 6 HOURS PRN
Qty: 18 G | Refills: 0 | Status: SHIPPED | OUTPATIENT
Start: 2018-04-23 | End: 2019-09-03

## 2018-04-23 NOTE — PROGRESS NOTES
Subjective:       Patient ID: Grazyna Mccoy is a 77 y.o. female.    Chief Complaint: Cough; Nasal Congestion; and Wheezing    Cough   This is a new problem. Episode onset: 4 days ago. The problem has been gradually worsening. The cough is non-productive. Associated symptoms include chest pain (with cough since yesterday), nasal congestion, rhinorrhea, a sore throat and wheezing. Pertinent negatives include no chills or fever. Nothing aggravates the symptoms.     Review of Systems   Constitutional: Negative for chills and fever.   HENT: Positive for congestion, rhinorrhea, sneezing and sore throat.    Respiratory: Positive for cough and wheezing.    Cardiovascular: Positive for chest pain (with cough since yesterday).       Objective:      Physical Exam   Constitutional: She is oriented to person, place, and time. She appears well-developed and well-nourished. No distress.   HENT:   Head: Normocephalic and atraumatic.   Right Ear: Tympanic membrane and ear canal normal.   Left Ear: Tympanic membrane and ear canal normal.   Nose: Mucosal edema and rhinorrhea present.   Mouth/Throat: Oropharynx is clear and moist and mucous membranes are normal.   Eyes: Conjunctivae and EOM are normal. Pupils are equal, round, and reactive to light. Right eye exhibits no discharge. Left eye exhibits no discharge.   Neck: Normal range of motion. Neck supple.   Cardiovascular: Normal rate and regular rhythm.  Exam reveals no gallop and no friction rub.    No murmur heard.  Pulmonary/Chest: Effort normal. No respiratory distress. She has no decreased breath sounds. She has wheezes. She has no rhonchi. She has no rales.   Lymphadenopathy:     She has no cervical adenopathy.   Neurological: She is alert and oriented to person, place, and time.   Skin: Skin is warm and dry. No rash noted.   Vitals reviewed.      Assessment:       1. Acute bronchitis, unspecified organism    2. Diet-controlled type 2 diabetes mellitus        Plan:      Problem List Items Addressed This Visit     Diet-controlled type 2 diabetes mellitus    Current Assessment & Plan     Advised to monitor blood sugar while on prednisone, notify MD if persistently >200s.           Other Visit Diagnoses     Acute bronchitis, unspecified organism    -  Primary    Relevant Medications    predniSONE (DELTASONE) 50 MG Tab    amoxicillin-clavulanate 875-125mg (AUGMENTIN) 875-125 mg per tablet    albuterol 90 mcg/actuation inhaler    inhalation spacing device ([employer] AEROCHAMBER)    Other Relevant Orders    X-Ray Chest PA And Lateral (Completed)        Advised ER for respiratory distress or other concerning symptoms. Follow up if not improving.

## 2018-04-23 NOTE — PATIENT INSTRUCTIONS
Bronchitis with Wheezing (Viral or Bacterial: Adult)    Bronchitis is an infection of the air passages. It often occurs during a cold and is usually caused by a virus. Symptoms include cough with mucus (phlegm) and low-grade fever. This illness is contagious during the first few days and is spread through the air by coughing and sneezing, or by direct contact (touching the sick person and then touching your own eyes, nose, or mouth).  If there is a lot of inflammation, air flow is restricted. The air passages may also go into spasm, especially if you have asthma. This causes wheezing and difficulty breathing even in people who do not have asthma.  Bronchitis usually lasts 7 to 14 days. The wheezing should improve with treatment during the first week. An inhaler is often prescribed to relax the air passages and stop wheezing. Antibiotics will be prescribed if your doctor thinks there is also a secondary bacterial infection.  Home care  · If symptoms are severe, rest at home for the first 2 to 3 days. When you go back to your usual activities, don't let yourself get too tired.  · Do not smoke. Also avoid being exposed to secondhand smoke.  · You may use over-the-counter medicine to control fever or pain, unless another medicine was prescribed. Note: If you have chronic liver or kidney disease or have ever had a stomach ulcer or gastrointestinal bleeding, talk with your healthcare provider before using these medicines. Also talk to your provider if you are taking medicine to prevent blood clots.) Aspirin should never be given to anyone younger than 18 years of age who is ill with a viral infection or fever. It may cause severe liver or brain damage.  · Your appetite may be poor, so a light diet is fine. Avoid dehydration by drinking 6 to 8 glasses of fluids per day (such as water, soft drinks, sports drinks, juices, tea, or soup). Extra fluids will help loosen secretions in the nose and lungs.  · Over-the-counter  cough, cold, and sore-throat medicines will not shorten the length of the illness, but they may be helpful to reduce symptoms. (Note: Do not use decongestants if you have high blood pressure.)  · If you were given an inhaler, use it exactly as directed. If you need to use it more often than prescribed, your condition may be worsening. If this happens, contact your healthcare provider.  · If prescribed, finish all antibiotic medicine, even if you are feeling better after only a few days.  Follow-up care  Follow up with your healthcare provider, or as advised. If you had an X-ray or ECG (electrocardiogram), a specialist will review it. You will be notified of any new findings that may affect your care.  Note: If you are age 65 or older, or if you have a chronic lung disease or condition that affects your immune system, or you smoke, talk to your healthcare provider about having a pneumococcal vaccinations and a yearly influenza vaccination (flu shot).  When to seek medical advice  Call your healthcare provider right away if any of these occur:  · Fever of 100.4°F (38°C) or higher  · Coughing up increasing amounts of colored sputum  · Weakness, drowsiness, headache, facial pain, ear pain, or a stiff neck  Call 911, or get immediate medical care  Contact emergency services right away if any of these occur.  · Coughing up blood  · Worsening weakness, drowsiness, headache, or stiff neck  · Increased wheezing not helped with medication, shortness of breath, or pain with breathing  Date Last Reviewed: 9/13/2015  © 9790-2670 [employer]. [address] [place] [address], [place] [address]. All rights reserved. This information is not intended as a substitute for professional medical care. Always follow your healthcare professional's instructions.        Using an Inhaler with a Spacer  To control asthma, you need to use your medicines the right way. Some medicines are inhaled using a device called a metered-dose inhaler  (MDI). Metered-dose inhalers deliver medicine with a fine spray. You may be asked to use a spacer (holding tube) with your inhaler. The spacer helps make sure all the medicine you need goes into your lungs.   Steps for using an inhaler with a spacer  Step 1:  · Remove the caps from the inhaler and spacer.  · Shake the inhaler well and attach the spacer. If the inhaler is being used for the first time or has not been used for a while, prime it as directed by the product maker.  Step 2:  · Breathe out normally.  · Put the spacer between your teeth. Close your lips tightly around it.  · Keep your chin up.  Step 3:  · Spray 1 puff into the spacer by pressing down on the inhaler.  · Then breathe in through your mouth as slowly and deeply as you can. This should take about 5-10 seconds. If you breathe too quickly, you may hear a whistling sound in certain spacers.  Step 4:  · Take the spacer out of your mouth.  · Hold your breath for a count of 10.  · Then hold your lips together and slowly breathe out through your mouth.          If youre prescribed more than 1 puff of medicine at a time, wait at least 30 seconds between puffs. This number may be different for different medicines. Shake the inhaler again. Then repeat steps 2 to 4.   Date Last Reviewed: 10/1/2016  © 1695-0309 [employer]. [address] [place] [address], [place] [address]. All rights reserved. This information is not intended as a substitute for professional medical care. Always follow your healthcare professional's instructions.

## 2018-05-17 DIAGNOSIS — I15.2 HYPERTENSION ASSOCIATED WITH DIABETES: ICD-10-CM

## 2018-05-17 DIAGNOSIS — E11.59 HYPERTENSION ASSOCIATED WITH DIABETES: ICD-10-CM

## 2018-07-13 ENCOUNTER — OFFICE VISIT (OUTPATIENT)
Dept: OPHTHALMOLOGY | Facility: CLINIC | Age: 78
End: 2018-07-13
Payer: MEDICARE

## 2018-07-13 DIAGNOSIS — E11.9 DIABETES MELLITUS TYPE 2 WITHOUT RETINOPATHY: Primary | ICD-10-CM

## 2018-07-13 DIAGNOSIS — H52.7 REFRACTIVE ERROR: ICD-10-CM

## 2018-07-13 DIAGNOSIS — Z13.5 SCREENING FOR GLAUCOMA: ICD-10-CM

## 2018-07-13 DIAGNOSIS — Z96.1 PSEUDOPHAKIA OF BOTH EYES: ICD-10-CM

## 2018-07-13 PROCEDURE — 99999 PR PBB SHADOW E&M-EST. PATIENT-LVL I: CPT | Mod: PBBFAC,,, | Performed by: OPTOMETRIST

## 2018-07-13 PROCEDURE — 92014 COMPRE OPH EXAM EST PT 1/>: CPT | Mod: S$GLB,,, | Performed by: OPTOMETRIST

## 2018-07-13 PROCEDURE — 92015 DETERMINE REFRACTIVE STATE: CPT | Mod: S$GLB,,, | Performed by: OPTOMETRIST

## 2018-07-13 NOTE — PROGRESS NOTES
HPI     Last [facility] exam 07/07/2017  Diabetic/NIDDM  Pseudophakia, OU  Screening for glaucoma   RE  No visual complaints    Last edited by Robert Tomas MA on 7/13/2018 11:06 AM. (History)            Assessment /Plan     For exam results, see Encounter Report.    Diabetes mellitus type 2 without retinopathy    Pseudophakia of both eyes    Screening for glaucoma    Refractive error      No diabetic retinopathy OU.  Stable PIOL OU.  OH OK OU.  Spec Rx given.  RTC one year or prn.

## 2018-07-27 ENCOUNTER — OFFICE VISIT (OUTPATIENT)
Dept: INTERNAL MEDICINE | Facility: CLINIC | Age: 78
End: 2018-07-27
Payer: MEDICARE

## 2018-07-27 ENCOUNTER — LAB VISIT (OUTPATIENT)
Dept: LAB | Facility: HOSPITAL | Age: 78
End: 2018-07-27
Attending: FAMILY MEDICINE
Payer: MEDICARE

## 2018-07-27 VITALS
DIASTOLIC BLOOD PRESSURE: 86 MMHG | BODY MASS INDEX: 34.39 KG/M2 | WEIGHT: 219.13 LBS | TEMPERATURE: 97 F | OXYGEN SATURATION: 95 % | SYSTOLIC BLOOD PRESSURE: 138 MMHG | HEART RATE: 70 BPM | HEIGHT: 67 IN

## 2018-07-27 DIAGNOSIS — E55.9 VITAMIN D DEFICIENCY: ICD-10-CM

## 2018-07-27 DIAGNOSIS — E11.9 DIABETES MELLITUS TYPE 2, DIET-CONTROLLED: ICD-10-CM

## 2018-07-27 DIAGNOSIS — I15.2 HYPERTENSION ASSOCIATED WITH DIABETES: ICD-10-CM

## 2018-07-27 DIAGNOSIS — E11.59 HYPERTENSION ASSOCIATED WITH DIABETES: ICD-10-CM

## 2018-07-27 DIAGNOSIS — E53.8 VITAMIN B12 DEFICIENCY: ICD-10-CM

## 2018-07-27 DIAGNOSIS — J01.90 ACUTE BACTERIAL SINUSITIS: ICD-10-CM

## 2018-07-27 DIAGNOSIS — B96.89 ACUTE BACTERIAL SINUSITIS: ICD-10-CM

## 2018-07-27 DIAGNOSIS — L98.9 SKIN LESIONS: Primary | ICD-10-CM

## 2018-07-27 LAB
25(OH)D3+25(OH)D2 SERPL-MCNC: 29 NG/ML
ANION GAP SERPL CALC-SCNC: 9 MMOL/L
BASOPHILS # BLD AUTO: 0.06 K/UL
BASOPHILS NFR BLD: 0.9 %
BUN SERPL-MCNC: 19 MG/DL
CALCIUM SERPL-MCNC: 9.4 MG/DL
CHLORIDE SERPL-SCNC: 102 MMOL/L
CHOLEST SERPL-MCNC: 200 MG/DL
CHOLEST/HDLC SERPL: 4 {RATIO}
CO2 SERPL-SCNC: 27 MMOL/L
CREAT SERPL-MCNC: 0.8 MG/DL
DIFFERENTIAL METHOD: ABNORMAL
EOSINOPHIL # BLD AUTO: 0.2 K/UL
EOSINOPHIL NFR BLD: 3.1 %
ERYTHROCYTE [DISTWIDTH] IN BLOOD BY AUTOMATED COUNT: 13.1 %
EST. GFR  (AFRICAN AMERICAN): >60 ML/MIN/1.73 M^2
EST. GFR  (NON AFRICAN AMERICAN): >60 ML/MIN/1.73 M^2
ESTIMATED AVG GLUCOSE: 154 MG/DL
GLUCOSE SERPL-MCNC: 143 MG/DL
HBA1C MFR BLD HPLC: 7 %
HCT VFR BLD AUTO: 40.5 %
HDLC SERPL-MCNC: 50 MG/DL
HDLC SERPL: 25 %
HGB BLD-MCNC: 12.9 G/DL
IMM GRANULOCYTES # BLD AUTO: 0.02 K/UL
IMM GRANULOCYTES NFR BLD AUTO: 0.3 %
LDLC SERPL CALC-MCNC: 127.8 MG/DL
LYMPHOCYTES # BLD AUTO: 2 K/UL
LYMPHOCYTES NFR BLD: 30 %
MCH RBC QN AUTO: 28.1 PG
MCHC RBC AUTO-ENTMCNC: 31.9 G/DL
MCV RBC AUTO: 88 FL
MONOCYTES # BLD AUTO: 0.6 K/UL
MONOCYTES NFR BLD: 9.3 %
NEUTROPHILS # BLD AUTO: 3.8 K/UL
NEUTROPHILS NFR BLD: 56.4 %
NONHDLC SERPL-MCNC: 150 MG/DL
NRBC BLD-RTO: 0 /100 WBC
PLATELET # BLD AUTO: 334 K/UL
PMV BLD AUTO: 10.3 FL
POTASSIUM SERPL-SCNC: 4.2 MMOL/L
RBC # BLD AUTO: 4.59 M/UL
SODIUM SERPL-SCNC: 138 MMOL/L
TRIGL SERPL-MCNC: 111 MG/DL
TSH SERPL DL<=0.005 MIU/L-ACNC: 1.94 UIU/ML
VIT B12 SERPL-MCNC: 271 PG/ML
WBC # BLD AUTO: 6.69 K/UL

## 2018-07-27 PROCEDURE — 3075F SYST BP GE 130 - 139MM HG: CPT | Mod: CPTII,S$GLB,, | Performed by: FAMILY MEDICINE

## 2018-07-27 PROCEDURE — 36415 COLL VENOUS BLD VENIPUNCTURE: CPT

## 2018-07-27 PROCEDURE — 84443 ASSAY THYROID STIM HORMONE: CPT

## 2018-07-27 PROCEDURE — 80048 BASIC METABOLIC PNL TOTAL CA: CPT

## 2018-07-27 PROCEDURE — 80061 LIPID PANEL: CPT

## 2018-07-27 PROCEDURE — 82607 VITAMIN B-12: CPT

## 2018-07-27 PROCEDURE — 3079F DIAST BP 80-89 MM HG: CPT | Mod: CPTII,S$GLB,, | Performed by: FAMILY MEDICINE

## 2018-07-27 PROCEDURE — 83036 HEMOGLOBIN GLYCOSYLATED A1C: CPT

## 2018-07-27 PROCEDURE — 82306 VITAMIN D 25 HYDROXY: CPT

## 2018-07-27 PROCEDURE — 99214 OFFICE O/P EST MOD 30 MIN: CPT | Mod: S$GLB,,, | Performed by: FAMILY MEDICINE

## 2018-07-27 PROCEDURE — 99999 PR PBB SHADOW E&M-EST. PATIENT-LVL III: CPT | Mod: PBBFAC,,, | Performed by: FAMILY MEDICINE

## 2018-07-27 PROCEDURE — 85025 COMPLETE CBC W/AUTO DIFF WBC: CPT

## 2018-07-27 RX ORDER — DOXYCYCLINE 100 MG/1
100 CAPSULE ORAL EVERY 12 HOURS
Qty: 14 CAPSULE | Refills: 0 | Status: SHIPPED | OUTPATIENT
Start: 2018-07-27 | End: 2018-08-03

## 2018-07-27 NOTE — PROGRESS NOTES
Subjective:       Patient ID: Grazyna Mccoy is a 77 y.o. female.    Chief Complaint: Cough and Nasal Congestion    Congestion, cough intermittently since bronchitis in April. Cough is better, but has persisted.      Cough   Chronicity: persistent. Episode onset: since bronchitis in April. The problem has been waxing and waning. The cough is non-productive. Associated symptoms include ear congestion, nasal congestion, postnasal drip and rhinorrhea. Pertinent negatives include no chest pain, chills, ear pain, fever, headaches, heartburn, hemoptysis, myalgias, rash, sore throat, shortness of breath, sweats, weight loss or wheezing. Exacerbated by: worse in the morning. She has tried nothing for the symptoms.     Review of Systems   Constitutional: Negative for chills, fever and weight loss.   HENT: Positive for postnasal drip and rhinorrhea. Negative for ear pain and sore throat.    Respiratory: Positive for cough. Negative for hemoptysis, shortness of breath and wheezing.    Cardiovascular: Negative for chest pain.   Gastrointestinal: Negative for heartburn.   Musculoskeletal: Negative for myalgias.   Skin: Negative for rash.   Neurological: Negative for headaches.       Objective:      Physical Exam   Constitutional: She is oriented to person, place, and time. She appears well-developed and well-nourished. No distress.   HENT:   Head: Normocephalic and atraumatic.   Right Ear: Tympanic membrane and ear canal normal.   Left Ear: Tympanic membrane and ear canal normal.   Nose: Mucosal edema and rhinorrhea present.   Mouth/Throat: Oropharynx is clear and moist and mucous membranes are normal.   Beefy, red appearance to nasal mucosa with yellow discharge.   Eyes: Conjunctivae and EOM are normal. Pupils are equal, round, and reactive to light.   Neck: Normal range of motion. Neck supple.   Cardiovascular: Normal rate and regular rhythm.  Exam reveals no gallop and no friction rub.    No murmur heard.  Pulmonary/Chest:  Effort normal and breath sounds normal. No respiratory distress. She has no wheezes. She has no rales.   Lymphadenopathy:     She has no cervical adenopathy.   Neurological: She is alert and oriented to person, place, and time.   Skin: Skin is warm and dry. No rash noted.   Several seborrheic keratoses noted; small, erythematous scaly lesion noted on chest   Vitals reviewed.      Assessment:       1. Skin lesions    2. Acute bacterial sinusitis        Plan:     Problem List Items Addressed This Visit     None      Visit Diagnoses     Skin lesions    -  Primary    Relevant Orders    Ambulatory Referral to Dermatology    Acute bacterial sinusitis        Relevant Medications    doxycycline (VIBRAMYCIN) 100 MG Cap        Patient getting routine labs today, advised to keep upcoming appointment with Stephanie Tomas to review.

## 2018-07-27 NOTE — PATIENT INSTRUCTIONS
Acute Sinusitis  Acute sinusitis is inflammation (irritation and swelling) of the sinuses. It is often due to a bacterial or viral infection of the sinuses. This may follow a cold or other upper respiratory illness. Your doctor can help you find relief. Read on to learn more.    What Is Acute Sinusitis?  Sinuses are air-filled spaces in the skull behind the face. They are kept moist and clean by a lining of mucosa. Things such as pollen, smoke, and chemical fumes can irritate the mucosa. It can then become inflamed (swell up). As a response to irritation, the mucosa makes more mucus and other fluids. Tiny hairlike cilia cover the mucosa. Cilia help transport mucus toward the opening of the sinus. Too much mucus may cause the cilia to stop working. This blocks the sinus opening. A buildup of fluid in the sinuses then leads to symptoms such as pain and pressure. It an also encourage growth of bacteria in the sinuses.  Common Symptoms of Acute Sinusitis  You may have:  · Facial pain  · Headache  · Fever  · Postnasal drip  · Nasal congestion  · Redness of facial skin over sinus  Diagnosis of Acute Sinusitis  The doctor will ask about your symptoms and medical history. An evaluation will be done. A culture (sample of mucus) is sometimes taken to check for bacteria. X-rays may be taken to view fluid in the sinuses.  Treatment of Acute Sinusitis  Treatment is designed to unblock the sinus opening and help the cilia work again. Antihistamine and decongestant medications may be prescribed. These can reduce inflammation and decrease fluid production. If a bacterial infection is present, it can be treated with antibiotic medication. This medication should be taken until it is gone, even if you feel better. Note that antibiotics will not help a viral infection.  © 2155-0718 [employer], [address] [place] [address], [place] [address]. All rights reserved. This information is not intended as a substitute for professional medical  care. Always follow your healthcare professional's instructions.

## 2018-08-15 ENCOUNTER — TELEPHONE (OUTPATIENT)
Dept: INTERNAL MEDICINE | Facility: CLINIC | Age: 78
End: 2018-08-15

## 2018-08-15 DIAGNOSIS — E53.8 VITAMIN B12 DEFICIENCY: Primary | ICD-10-CM

## 2018-08-15 RX ORDER — LANOLIN ALCOHOL/MO/W.PET/CERES
1000 CREAM (GRAM) TOPICAL DAILY
COMMUNITY
Start: 2018-08-15 | End: 2019-09-03

## 2018-08-15 NOTE — TELEPHONE ENCOUNTER
----- Message from Nathalie Landrum sent at 8/15/2018 11:48 AM CDT -----  Contact: pt  States she's calling regarding her lab results from 7/27. Please call pt at [phone]. Thank you

## 2018-08-15 NOTE — TELEPHONE ENCOUNTER
Please notify patient that her labs are relatively stable. a1c is 7.0, recommend she watch her diet more closely. Vitamin D is slightly low, continue supplement. Her B12 is low, recommend OTC vitamin B12 1,000 mcg daily. Her cholesterol is above goal for a diabetic. Recommend we start statin to improve this. Please schedule visit to discuss risks/benefits if desired.

## 2018-08-15 NOTE — TELEPHONE ENCOUNTER
Spoke with patient informing on lab results and patient setup an appt to discuss the med statin. Patient verbalized understanding.

## 2018-08-24 ENCOUNTER — OFFICE VISIT (OUTPATIENT)
Dept: INTERNAL MEDICINE | Facility: CLINIC | Age: 78
End: 2018-08-24
Payer: MEDICARE

## 2018-08-24 VITALS
SYSTOLIC BLOOD PRESSURE: 138 MMHG | DIASTOLIC BLOOD PRESSURE: 64 MMHG | WEIGHT: 220.69 LBS | HEIGHT: 67 IN | OXYGEN SATURATION: 97 % | BODY MASS INDEX: 34.64 KG/M2 | HEART RATE: 84 BPM | TEMPERATURE: 97 F

## 2018-08-24 DIAGNOSIS — E11.9 DIET-CONTROLLED TYPE 2 DIABETES MELLITUS: ICD-10-CM

## 2018-08-24 DIAGNOSIS — I15.2 HYPERTENSION ASSOCIATED WITH DIABETES: ICD-10-CM

## 2018-08-24 DIAGNOSIS — E53.8 VITAMIN B12 DEFICIENCY: ICD-10-CM

## 2018-08-24 DIAGNOSIS — E11.69 HYPERLIPIDEMIA ASSOCIATED WITH TYPE 2 DIABETES MELLITUS: Primary | ICD-10-CM

## 2018-08-24 DIAGNOSIS — E78.5 HYPERLIPIDEMIA ASSOCIATED WITH TYPE 2 DIABETES MELLITUS: Primary | ICD-10-CM

## 2018-08-24 DIAGNOSIS — E55.9 VITAMIN D DEFICIENCY: ICD-10-CM

## 2018-08-24 DIAGNOSIS — E11.59 HYPERTENSION ASSOCIATED WITH DIABETES: ICD-10-CM

## 2018-08-24 PROCEDURE — 99999 PR PBB SHADOW E&M-EST. PATIENT-LVL IV: CPT | Mod: PBBFAC,,, | Performed by: FAMILY MEDICINE

## 2018-08-24 PROCEDURE — 3078F DIAST BP <80 MM HG: CPT | Mod: CPTII,S$GLB,, | Performed by: FAMILY MEDICINE

## 2018-08-24 PROCEDURE — 3075F SYST BP GE 130 - 139MM HG: CPT | Mod: CPTII,S$GLB,, | Performed by: FAMILY MEDICINE

## 2018-08-24 PROCEDURE — 99214 OFFICE O/P EST MOD 30 MIN: CPT | Mod: S$GLB,,, | Performed by: FAMILY MEDICINE

## 2018-08-27 NOTE — PROGRESS NOTES
Subjective:       Patient ID: Grazyna Mccoy is a 78 y.o. female.    Chief Complaint: Follow-up    Patient presents to clinic today for followup of chronic conditions.      Review of Systems   Constitutional: Negative for chills, fatigue, fever and unexpected weight change.   Eyes: Negative for visual disturbance.   Respiratory: Negative for shortness of breath.    Cardiovascular: Negative for chest pain.   Musculoskeletal: Negative for myalgias.   Neurological: Negative for headaches.       Objective:      Physical Exam   Constitutional: She is oriented to person, place, and time. She appears well-developed and well-nourished. No distress.   HENT:   Head: Normocephalic and atraumatic.   Eyes: Conjunctivae and EOM are normal. Pupils are equal, round, and reactive to light. No scleral icterus.   Pulmonary/Chest: Effort normal.   Neurological: She is alert and oriented to person, place, and time. No cranial nerve deficit. Gait normal.   Psychiatric: She has a normal mood and affect.   Vitals reviewed.      Assessment:       1. Hyperlipidemia associated with type 2 diabetes mellitus    2. Hypertension associated with diabetes    3. Diet-controlled type 2 diabetes mellitus    4. Vitamin D deficiency    5. Vitamin B12 deficiency        Plan:     Problem List Items Addressed This Visit     Diet-controlled type 2 diabetes mellitus    Current Assessment & Plan     a1c 7.0, advised diet changes, if not improving will need to consider medication         Hypertension associated with diabetes    Current Assessment & Plan     Controlled, continue current meds         Vitamin D deficiency    Current Assessment & Plan     Level low, continue vitamin D3 2,000 IU daily         Vitamin B12 deficiency    Current Assessment & Plan     Level low, resume vitamin B12 1,000 mcg daily         Hyperlipidemia associated with type 2 diabetes mellitus - Primary    Current Assessment & Plan     , above goal, discussed with patient; she  has statin intolerance, offered zetia, patient declines at this time         Relevant Orders    Comprehensive metabolic panel          Health Maintenance reviewed/updated. Advised to obtain flu vaccine this Fall.

## 2018-08-27 NOTE — ASSESSMENT & PLAN NOTE
, above goal, discussed with patient; she has statin intolerance, offered zetia, patient declines at this time

## 2018-10-24 ENCOUNTER — TELEPHONE (OUTPATIENT)
Dept: INTERNAL MEDICINE | Facility: CLINIC | Age: 78
End: 2018-10-24

## 2018-10-24 NOTE — TELEPHONE ENCOUNTER
----- Message from Alison Green sent at 10/24/2018 12:23 PM CDT -----  Contact: Patient  Patient called and scheduled for her Flu shot on Friday; she wants to know if she can do the Pneumonia shot as well. She can be contacted at [phone].    Thanks,  Alison

## 2018-10-26 ENCOUNTER — IMMUNIZATION (OUTPATIENT)
Dept: INTERNAL MEDICINE | Facility: CLINIC | Age: 78
End: 2018-10-26
Payer: MEDICARE

## 2018-10-26 PROCEDURE — 90662 IIV NO PRSV INCREASED AG IM: CPT | Mod: PBBFAC

## 2019-01-10 ENCOUNTER — OFFICE VISIT (OUTPATIENT)
Dept: INTERNAL MEDICINE | Facility: CLINIC | Age: 79
End: 2019-01-10
Payer: MEDICARE

## 2019-01-10 VITALS
SYSTOLIC BLOOD PRESSURE: 160 MMHG | OXYGEN SATURATION: 96 % | HEIGHT: 67 IN | WEIGHT: 219.38 LBS | DIASTOLIC BLOOD PRESSURE: 76 MMHG | BODY MASS INDEX: 34.43 KG/M2 | HEART RATE: 81 BPM | TEMPERATURE: 99 F

## 2019-01-10 DIAGNOSIS — E11.9 DIET-CONTROLLED TYPE 2 DIABETES MELLITUS: ICD-10-CM

## 2019-01-10 DIAGNOSIS — J40 BRONCHITIS: Primary | ICD-10-CM

## 2019-01-10 PROCEDURE — 3078F DIAST BP <80 MM HG: CPT | Mod: CPTII,S$GLB,, | Performed by: PHYSICIAN ASSISTANT

## 2019-01-10 PROCEDURE — 1101F PT FALLS ASSESS-DOCD LE1/YR: CPT | Mod: CPTII,S$GLB,, | Performed by: PHYSICIAN ASSISTANT

## 2019-01-10 PROCEDURE — 96372 PR INJECTION,THERAP/PROPH/DIAG2ST, IM OR SUBCUT: ICD-10-PCS | Mod: S$GLB,,, | Performed by: PHYSICIAN ASSISTANT

## 2019-01-10 PROCEDURE — 3077F SYST BP >= 140 MM HG: CPT | Mod: CPTII,S$GLB,, | Performed by: PHYSICIAN ASSISTANT

## 2019-01-10 PROCEDURE — 1101F PR PT FALLS ASSESS DOC 0-1 FALLS W/OUT INJ PAST YR: ICD-10-PCS | Mod: CPTII,S$GLB,, | Performed by: PHYSICIAN ASSISTANT

## 2019-01-10 PROCEDURE — 99999 PR PBB SHADOW E&M-EST. PATIENT-LVL V: CPT | Mod: PBBFAC,,, | Performed by: PHYSICIAN ASSISTANT

## 2019-01-10 PROCEDURE — 3078F PR MOST RECENT DIASTOLIC BLOOD PRESSURE < 80 MM HG: ICD-10-PCS | Mod: CPTII,S$GLB,, | Performed by: PHYSICIAN ASSISTANT

## 2019-01-10 PROCEDURE — 99214 OFFICE O/P EST MOD 30 MIN: CPT | Mod: 25,S$GLB,, | Performed by: PHYSICIAN ASSISTANT

## 2019-01-10 PROCEDURE — 99999 PR PBB SHADOW E&M-EST. PATIENT-LVL V: ICD-10-PCS | Mod: PBBFAC,,, | Performed by: PHYSICIAN ASSISTANT

## 2019-01-10 PROCEDURE — 3077F PR MOST RECENT SYSTOLIC BLOOD PRESSURE >= 140 MM HG: ICD-10-PCS | Mod: CPTII,S$GLB,, | Performed by: PHYSICIAN ASSISTANT

## 2019-01-10 PROCEDURE — 99214 PR OFFICE/OUTPT VISIT, EST, LEVL IV, 30-39 MIN: ICD-10-PCS | Mod: 25,S$GLB,, | Performed by: PHYSICIAN ASSISTANT

## 2019-01-10 PROCEDURE — 96372 THER/PROPH/DIAG INJ SC/IM: CPT | Mod: S$GLB,,, | Performed by: PHYSICIAN ASSISTANT

## 2019-01-10 RX ORDER — METHYLPREDNISOLONE ACETATE 40 MG/ML
40 INJECTION, SUSPENSION INTRA-ARTICULAR; INTRALESIONAL; INTRAMUSCULAR; SOFT TISSUE
Status: COMPLETED | OUTPATIENT
Start: 2019-01-10 | End: 2019-01-10

## 2019-01-10 RX ORDER — PROMETHAZINE HYDROCHLORIDE AND DEXTROMETHORPHAN HYDROBROMIDE 6.25; 15 MG/5ML; MG/5ML
5 SYRUP ORAL 3 TIMES DAILY PRN
Qty: 240 ML | Refills: 0 | Status: SHIPPED | OUTPATIENT
Start: 2019-01-10 | End: 2019-01-20

## 2019-01-10 RX ORDER — ALBUTEROL SULFATE 90 UG/1
2 AEROSOL, METERED RESPIRATORY (INHALATION) EVERY 6 HOURS PRN
Qty: 18 G | Refills: 0 | Status: SHIPPED | OUTPATIENT
Start: 2019-01-10 | End: 2019-09-03

## 2019-01-10 RX ADMIN — METHYLPREDNISOLONE ACETATE 40 MG: 40 INJECTION, SUSPENSION INTRA-ARTICULAR; INTRALESIONAL; INTRAMUSCULAR; SOFT TISSUE at 02:01

## 2019-01-10 NOTE — PROGRESS NOTES
Subjective:       Patient ID: Grazyna Mccoy is a 78 y.o. female.    Chief Complaint: URI (PCP - Jeniffer)    URI    This is a new problem. The current episode started in the past 7 days. The problem has been gradually worsening. There has been no fever. Associated symptoms include congestion, coughing, rhinorrhea, sneezing and wheezing. Pertinent negatives include no abdominal pain, chest pain or sinus pain. She has tried nothing for the symptoms.     Past Medical History:   Diagnosis Date    Anxiety     followed by Dr. Mabry    Carotid stenosis     Depression     followed by Dr. Mabry    Diabetes mellitus type II     Hyperlipidemia     Hyperparathyroidism, unspecified     Has appointment to establish with Dr. Gutierrez    Hypertension     Leukopenia     Vertigo     followed by ENT    Vitamin B12 deficiency     Vitamin D deficiency        Review of Systems   Constitutional: Positive for activity change. Negative for chills, fatigue and fever.   HENT: Positive for congestion, postnasal drip, rhinorrhea and sneezing. Negative for sinus pressure and sinus pain.    Respiratory: Positive for cough and wheezing. Negative for chest tightness.    Cardiovascular: Negative for chest pain.   Gastrointestinal: Negative for abdominal pain.       Objective:      Physical Exam   Constitutional: She appears well-developed and well-nourished. No distress.   HENT:   Head: Normocephalic and atraumatic.   Right Ear: Tympanic membrane and ear canal normal.   Left Ear: Tympanic membrane normal.   Nose: Mucosal edema and rhinorrhea present.   Mouth/Throat: Uvula is midline and mucous membranes are normal. Posterior oropharyngeal edema and posterior oropharyngeal erythema present. No tonsillar exudate.   Neck: Neck supple.   Cardiovascular: Normal rate and regular rhythm. Exam reveals no gallop and no friction rub.   No murmur heard.  Pulmonary/Chest: Effort normal. No stridor. No respiratory distress. She has wheezes. She has  no rales. She exhibits no tenderness.   Lymphadenopathy:     She has no cervical adenopathy.   Skin: She is not diaphoretic.   Nursing note and vitals reviewed.      Assessment:       1. Bronchitis    2. Diet-controlled type 2 diabetes mellitus        Plan:       Bronchitis    Diet-controlled type 2 diabetes mellitus  Suggest to follow up with PCP     Other orders  -     albuterol (PROAIR HFA) 90 mcg/actuation inhaler; Inhale 2 puffs into the lungs every 6 (six) hours as needed for Wheezing. Rescue  Dispense: 18 g; Refill: 0  -     methylPREDNISolone acetate injection 40 mg  -     promethazine-dextromethorphan (PROMETHAZINE-DM) 6.25-15 mg/5 mL Syrp; Take 5 mLs by mouth 3 (three) times daily as needed.  Dispense: 240 mL; Refill: 0  Patient request a steroid injection:  Discussed the side effects such as elevated glucose associated with these injections.   She understands and is willing to accept the risk. She will follow her glucoses daily at home and let me know if her numbers go above 350.

## 2019-01-10 NOTE — PATIENT INSTRUCTIONS
Bronchitis with Wheezing (Viral or Bacterial: Adult)    Bronchitis is an infection of the air passages. It often occurs during a cold and is usually caused by a virus. Symptoms include cough with mucus (phlegm) and low-grade fever. This illness is contagious during the first few days and is spread through the air by coughing and sneezing, or by direct contact (touching the sick person and then touching your own eyes, nose, or mouth).  If there is a lot of inflammation, air flow is restricted. The air passages may also go into spasm, especially if you have asthma. This causes wheezing and difficulty breathing even in people who do not have asthma.  Bronchitis usually lasts 7 to 14 days. The wheezing should improve with treatment during the first week. An inhaler is often prescribed to relax the air passages and stop wheezing. Antibiotics will be prescribed if your doctor thinks there is also a secondary bacterial infection.  Home care  · If symptoms are severe, rest at home for the first 2 to 3 days. When you go back to your usual activities, don't let yourself get too tired.  · Do not smoke. Also avoid being exposed to secondhand smoke.  · You may use over-the-counter medicine to control fever or pain, unless another medicine was prescribed. Note: If you have chronic liver or kidney disease or have ever had a stomach ulcer or gastrointestinal bleeding, talk with your healthcare provider before using these medicines. Also talk to your provider if you are taking medicine to prevent blood clots.) Aspirin should never be given to anyone younger than 18 years of age who is ill with a viral infection or fever. It may cause severe liver or brain damage.  · Your appetite may be poor, so a light diet is fine. Avoid dehydration by drinking 6 to 8 glasses of fluids per day (such as water, soft drinks, sports drinks, juices, tea, or soup). Extra fluids will help loosen secretions in the nose and lungs.  · Over-the-counter  cough, cold, and sore-throat medicines will not shorten the length of the illness, but they may be helpful to reduce symptoms. (Note: Do not use decongestants if you have high blood pressure.)  · If you were given an inhaler, use it exactly as directed. If you need to use it more often than prescribed, your condition may be worsening. If this happens, contact your healthcare provider.  · If prescribed, finish all antibiotic medicine, even if you are feeling better after only a few days.  Follow-up care  Follow up with your healthcare provider, or as advised. If you had an X-ray or ECG (electrocardiogram), a specialist will review it. You will be notified of any new findings that may affect your care.  Note: If you are age 65 or older, or if you have a chronic lung disease or condition that affects your immune system, or you smoke, talk to your healthcare provider about having a pneumococcal vaccinations and a yearly influenza vaccination (flu shot).  When to seek medical advice  Call your healthcare provider right away if any of these occur:  · Fever of 100.4°F (38°C) or higher  · Coughing up increasing amounts of colored sputum  · Weakness, drowsiness, headache, facial pain, ear pain, or a stiff neck  Call 911, or get immediate medical care  Contact emergency services right away if any of these occur.  · Coughing up blood  · Worsening weakness, drowsiness, headache, or stiff neck  · Increased wheezing not helped with medication, shortness of breath, or pain with breathing  Date Last Reviewed: 9/13/2015  © 3405-0292 [employer]. [address] [place] [address], [place] [address]. All rights reserved. This information is not intended as a substitute for professional medical care. Always follow your healthcare professional's instructions.

## 2019-02-11 ENCOUNTER — PATIENT OUTREACH (OUTPATIENT)
Dept: ADMINISTRATIVE | Facility: HOSPITAL | Age: 79
End: 2019-02-11

## 2019-02-11 DIAGNOSIS — E78.5 HYPERLIPIDEMIA ASSOCIATED WITH TYPE 2 DIABETES MELLITUS: ICD-10-CM

## 2019-02-11 DIAGNOSIS — E11.9 DIET-CONTROLLED TYPE 2 DIABETES MELLITUS: ICD-10-CM

## 2019-02-11 DIAGNOSIS — E11.59 HYPERTENSION ASSOCIATED WITH DIABETES: Primary | ICD-10-CM

## 2019-02-11 DIAGNOSIS — I15.2 HYPERTENSION ASSOCIATED WITH DIABETES: Primary | ICD-10-CM

## 2019-02-11 DIAGNOSIS — E11.69 HYPERLIPIDEMIA ASSOCIATED WITH TYPE 2 DIABETES MELLITUS: ICD-10-CM

## 2019-02-18 ENCOUNTER — LAB VISIT (OUTPATIENT)
Dept: LAB | Facility: HOSPITAL | Age: 79
End: 2019-02-18
Attending: FAMILY MEDICINE
Payer: MEDICARE

## 2019-02-18 DIAGNOSIS — E78.5 HYPERLIPIDEMIA ASSOCIATED WITH TYPE 2 DIABETES MELLITUS: ICD-10-CM

## 2019-02-18 DIAGNOSIS — E11.59 HYPERTENSION ASSOCIATED WITH DIABETES: ICD-10-CM

## 2019-02-18 DIAGNOSIS — E11.69 HYPERLIPIDEMIA ASSOCIATED WITH TYPE 2 DIABETES MELLITUS: ICD-10-CM

## 2019-02-18 DIAGNOSIS — E11.9 DIET-CONTROLLED TYPE 2 DIABETES MELLITUS: ICD-10-CM

## 2019-02-18 DIAGNOSIS — E55.9 VITAMIN D DEFICIENCY: ICD-10-CM

## 2019-02-18 DIAGNOSIS — E53.8 VITAMIN B12 DEFICIENCY: ICD-10-CM

## 2019-02-18 DIAGNOSIS — I15.2 HYPERTENSION ASSOCIATED WITH DIABETES: ICD-10-CM

## 2019-02-18 DIAGNOSIS — E11.9 DIABETES MELLITUS TYPE 2, DIET-CONTROLLED: ICD-10-CM

## 2019-02-18 LAB
25(OH)D3+25(OH)D2 SERPL-MCNC: 31 NG/ML
ALBUMIN/CREAT UR: 12 UG/MG
CHOLEST SERPL-MCNC: 193 MG/DL
CHOLEST/HDLC SERPL: 4 {RATIO}
CREAT UR-MCNC: 92 MG/DL
ESTIMATED AVG GLUCOSE: 160 MG/DL
HBA1C MFR BLD HPLC: 7.2 %
HDLC SERPL-MCNC: 48 MG/DL
HDLC SERPL: 24.9 %
LDLC SERPL CALC-MCNC: 116.8 MG/DL
MICROALBUMIN UR DL<=1MG/L-MCNC: 11 UG/ML
NONHDLC SERPL-MCNC: 145 MG/DL
TRIGL SERPL-MCNC: 141 MG/DL

## 2019-02-18 PROCEDURE — 83036 HEMOGLOBIN GLYCOSYLATED A1C: CPT

## 2019-02-18 PROCEDURE — 82306 VITAMIN D 25 HYDROXY: CPT

## 2019-02-18 PROCEDURE — 80061 LIPID PANEL: CPT

## 2019-02-18 PROCEDURE — 82043 UR ALBUMIN QUANTITATIVE: CPT

## 2019-02-18 PROCEDURE — 36415 COLL VENOUS BLD VENIPUNCTURE: CPT

## 2019-02-19 RX ORDER — VIT C/E/ZN/COPPR/LUTEIN/ZEAXAN 250MG-90MG
2000 CAPSULE ORAL DAILY
Qty: 30 CAPSULE | Refills: 2 | OUTPATIENT
Start: 2019-02-19

## 2019-02-19 RX ORDER — LANOLIN ALCOHOL/MO/W.PET/CERES
1000 CREAM (GRAM) TOPICAL DAILY
Qty: 30 TABLET | Refills: 2 | OUTPATIENT
Start: 2019-02-19

## 2019-02-25 ENCOUNTER — OFFICE VISIT (OUTPATIENT)
Dept: INTERNAL MEDICINE | Facility: CLINIC | Age: 79
End: 2019-02-25
Payer: MEDICARE

## 2019-02-25 VITALS
DIASTOLIC BLOOD PRESSURE: 78 MMHG | TEMPERATURE: 98 F | SYSTOLIC BLOOD PRESSURE: 136 MMHG | HEART RATE: 86 BPM | OXYGEN SATURATION: 96 % | WEIGHT: 222.88 LBS | BODY MASS INDEX: 34.91 KG/M2

## 2019-02-25 DIAGNOSIS — E11.59 HYPERTENSION ASSOCIATED WITH DIABETES: ICD-10-CM

## 2019-02-25 DIAGNOSIS — R05.3 CHRONIC COUGH: Primary | ICD-10-CM

## 2019-02-25 DIAGNOSIS — I15.2 HYPERTENSION ASSOCIATED WITH DIABETES: ICD-10-CM

## 2019-02-25 DIAGNOSIS — E78.5 HYPERLIPIDEMIA ASSOCIATED WITH TYPE 2 DIABETES MELLITUS: ICD-10-CM

## 2019-02-25 DIAGNOSIS — R06.09 DYSPNEA ON EXERTION: ICD-10-CM

## 2019-02-25 DIAGNOSIS — R32 URINARY INCONTINENCE, UNSPECIFIED TYPE: ICD-10-CM

## 2019-02-25 DIAGNOSIS — L98.9 SKIN LESIONS: ICD-10-CM

## 2019-02-25 DIAGNOSIS — Z78.9 STATIN INTOLERANCE: ICD-10-CM

## 2019-02-25 DIAGNOSIS — E11.69 HYPERLIPIDEMIA ASSOCIATED WITH TYPE 2 DIABETES MELLITUS: ICD-10-CM

## 2019-02-25 LAB
BACTERIA #/AREA URNS HPF: ABNORMAL /HPF
BILIRUB UR QL STRIP: NEGATIVE
CLARITY UR: CLEAR
COLOR UR: YELLOW
GLUCOSE UR QL STRIP: NEGATIVE
HGB UR QL STRIP: NEGATIVE
KETONES UR QL STRIP: NEGATIVE
LEUKOCYTE ESTERASE UR QL STRIP: ABNORMAL
MICROSCOPIC COMMENT: ABNORMAL
NITRITE UR QL STRIP: NEGATIVE
NON-SQ EPI CELLS #/AREA URNS HPF: 2 /HPF
PH UR STRIP: 6 [PH] (ref 5–8)
PROT UR QL STRIP: NEGATIVE
RBC #/AREA URNS HPF: 1 /HPF (ref 0–4)
SP GR UR STRIP: 1.01 (ref 1–1.03)
SQUAMOUS #/AREA URNS HPF: 1 /HPF
URN SPEC COLLECT METH UR: ABNORMAL
WBC #/AREA URNS HPF: 3 /HPF (ref 0–5)

## 2019-02-25 PROCEDURE — 99214 OFFICE O/P EST MOD 30 MIN: CPT | Mod: S$GLB,,, | Performed by: FAMILY MEDICINE

## 2019-02-25 PROCEDURE — 1101F PR PT FALLS ASSESS DOC 0-1 FALLS W/OUT INJ PAST YR: ICD-10-PCS | Mod: CPTII,S$GLB,, | Performed by: FAMILY MEDICINE

## 2019-02-25 PROCEDURE — 1101F PT FALLS ASSESS-DOCD LE1/YR: CPT | Mod: CPTII,S$GLB,, | Performed by: FAMILY MEDICINE

## 2019-02-25 PROCEDURE — 3075F SYST BP GE 130 - 139MM HG: CPT | Mod: CPTII,S$GLB,, | Performed by: FAMILY MEDICINE

## 2019-02-25 PROCEDURE — 99999 PR PBB SHADOW E&M-EST. PATIENT-LVL V: CPT | Mod: PBBFAC,,, | Performed by: FAMILY MEDICINE

## 2019-02-25 PROCEDURE — 3078F PR MOST RECENT DIASTOLIC BLOOD PRESSURE < 80 MM HG: ICD-10-PCS | Mod: CPTII,S$GLB,, | Performed by: FAMILY MEDICINE

## 2019-02-25 PROCEDURE — 3078F DIAST BP <80 MM HG: CPT | Mod: CPTII,S$GLB,, | Performed by: FAMILY MEDICINE

## 2019-02-25 PROCEDURE — 99999 PR PBB SHADOW E&M-EST. PATIENT-LVL V: ICD-10-PCS | Mod: PBBFAC,,, | Performed by: FAMILY MEDICINE

## 2019-02-25 PROCEDURE — 81000 URINALYSIS NONAUTO W/SCOPE: CPT

## 2019-02-25 PROCEDURE — 3075F PR MOST RECENT SYSTOLIC BLOOD PRESS GE 130-139MM HG: ICD-10-PCS | Mod: CPTII,S$GLB,, | Performed by: FAMILY MEDICINE

## 2019-02-25 PROCEDURE — 99214 PR OFFICE/OUTPT VISIT, EST, LEVL IV, 30-39 MIN: ICD-10-PCS | Mod: S$GLB,,, | Performed by: FAMILY MEDICINE

## 2019-02-25 PROCEDURE — 87086 URINE CULTURE/COLONY COUNT: CPT

## 2019-02-25 RX ORDER — EZETIMIBE 10 MG/1
10 TABLET ORAL DAILY
Qty: 90 TABLET | Refills: 3 | Status: SHIPPED | OUTPATIENT
Start: 2019-02-25 | End: 2019-03-15 | Stop reason: SDUPTHER

## 2019-02-25 RX ORDER — LOSARTAN POTASSIUM 50 MG/1
50 TABLET ORAL DAILY
Qty: 90 TABLET | Refills: 3 | Status: SHIPPED | OUTPATIENT
Start: 2019-02-25 | End: 2019-08-08 | Stop reason: SDUPTHER

## 2019-02-26 LAB — BACTERIA UR CULT: NORMAL

## 2019-02-26 NOTE — PROGRESS NOTES
Subjective:       Patient ID: Grazyna Mccoy is a 78 y.o. female.    Chief Complaint: Follow-up    Patient presents to clinic today for followup of chronic conditions.      Review of Systems   Constitutional: Negative for chills, fatigue, fever and unexpected weight change.   Eyes: Negative for visual disturbance.   Respiratory: Positive for cough and shortness of breath.         Patient reports recurrent bronchitis with residual chronic cough   Cardiovascular: Negative for chest pain.   Genitourinary: Positive for enuresis (patient reports incontinence requiring she wear pad for many months).   Musculoskeletal: Negative for myalgias.   Neurological: Negative for headaches.       Objective:      Physical Exam   Constitutional: She is oriented to person, place, and time. She appears well-developed and well-nourished. No distress.   HENT:   Head: Normocephalic and atraumatic.   Eyes: Conjunctivae and EOM are normal. Pupils are equal, round, and reactive to light. No scleral icterus.   Cardiovascular: Normal rate and regular rhythm. Exam reveals no gallop and no friction rub.   No murmur heard.  Pulmonary/Chest: Effort normal and breath sounds normal.   Neurological: She is alert and oriented to person, place, and time. No cranial nerve deficit. Gait normal.   Psychiatric: She has a normal mood and affect.   Vitals reviewed.      Assessment:       1. Chronic cough    2. Hypertension associated with diabetes    3. Hyperlipidemia associated with type 2 diabetes mellitus    4. Urinary incontinence, unspecified type    5. Dyspnea on exertion    6. Skin lesions    7. Diabetes mellitus type 2, uncontrolled, with complications    8. Statin intolerance        Plan:     Problem List Items Addressed This Visit     Diabetes mellitus type 2, uncontrolled, with complications    Current Assessment & Plan     a1c 7.2; patient opts to work on diet to improve vs. Considering metformin; reassess in 3 months         Hypertension  associated with diabetes    Current Assessment & Plan     Normal readings at home per patient report; repeat reading borderline; continue current medications and monitoring         Relevant Medications    losartan (COZAAR) 50 MG tablet    Statin intolerance    Hyperlipidemia associated with type 2 diabetes mellitus    Current Assessment & Plan     Statin intolerant; ; discussed risks of CV events; she will try zetia; reassess in 3 months         Relevant Medications    ezetimibe (ZETIA) 10 mg tablet    Chronic cough - Primary    Relevant Orders    Ambulatory referral to Pulmonology    Urinary incontinence    Relevant Orders    Urinalysis (Completed)    Urine culture    Dyspnea on exertion    Relevant Orders    Ambulatory referral to Cardiology    Skin lesions    Relevant Orders    Ambulatory Referral to Dermatology          Health Maintenance reviewed/updated.

## 2019-02-26 NOTE — ASSESSMENT & PLAN NOTE
Normal readings at home per patient report; repeat reading borderline; continue current medications and monitoring

## 2019-03-15 DIAGNOSIS — E11.69 HYPERLIPIDEMIA ASSOCIATED WITH TYPE 2 DIABETES MELLITUS: ICD-10-CM

## 2019-03-15 DIAGNOSIS — E78.5 HYPERLIPIDEMIA ASSOCIATED WITH TYPE 2 DIABETES MELLITUS: ICD-10-CM

## 2019-03-15 RX ORDER — EZETIMIBE 10 MG/1
10 TABLET ORAL DAILY
Qty: 90 TABLET | Refills: 3 | Status: SHIPPED | OUTPATIENT
Start: 2019-03-15 | End: 2019-08-08 | Stop reason: SDUPTHER

## 2019-03-19 ENCOUNTER — LAB VISIT (OUTPATIENT)
Dept: LAB | Facility: HOSPITAL | Age: 79
End: 2019-03-19
Attending: INTERNAL MEDICINE
Payer: MEDICARE

## 2019-03-19 ENCOUNTER — OFFICE VISIT (OUTPATIENT)
Dept: PULMONOLOGY | Facility: CLINIC | Age: 79
End: 2019-03-19
Payer: MEDICARE

## 2019-03-19 VITALS
RESPIRATION RATE: 18 BRPM | BODY MASS INDEX: 34.88 KG/M2 | SYSTOLIC BLOOD PRESSURE: 160 MMHG | HEIGHT: 67 IN | WEIGHT: 222.25 LBS | HEART RATE: 80 BPM | DIASTOLIC BLOOD PRESSURE: 83 MMHG | OXYGEN SATURATION: 98 %

## 2019-03-19 DIAGNOSIS — R05.3 CHRONIC COUGH: Chronic | ICD-10-CM

## 2019-03-19 DIAGNOSIS — R05.3 CHRONIC COUGH: Primary | Chronic | ICD-10-CM

## 2019-03-19 PROBLEM — R05.8 ALLERGIC COUGH: Status: ACTIVE | Noted: 2019-02-25

## 2019-03-19 LAB
CRP SERPL-MCNC: 7.8 MG/L
ERYTHROCYTE [SEDIMENTATION RATE] IN BLOOD BY WESTERGREN METHOD: 10 MM/HR

## 2019-03-19 PROCEDURE — 99205 OFFICE O/P NEW HI 60 MIN: CPT | Mod: S$GLB,,, | Performed by: INTERNAL MEDICINE

## 2019-03-19 PROCEDURE — 99205 PR OFFICE/OUTPT VISIT, NEW, LEVL V, 60-74 MIN: ICD-10-PCS | Mod: S$GLB,,, | Performed by: INTERNAL MEDICINE

## 2019-03-19 PROCEDURE — 36415 COLL VENOUS BLD VENIPUNCTURE: CPT

## 2019-03-19 PROCEDURE — 3077F PR MOST RECENT SYSTOLIC BLOOD PRESSURE >= 140 MM HG: ICD-10-PCS | Mod: CPTII,S$GLB,, | Performed by: INTERNAL MEDICINE

## 2019-03-19 PROCEDURE — 85651 RBC SED RATE NONAUTOMATED: CPT

## 2019-03-19 PROCEDURE — 3079F DIAST BP 80-89 MM HG: CPT | Mod: CPTII,S$GLB,, | Performed by: INTERNAL MEDICINE

## 2019-03-19 PROCEDURE — 86038 ANTINUCLEAR ANTIBODIES: CPT

## 2019-03-19 PROCEDURE — 99999 PR PBB SHADOW E&M-EST. PATIENT-LVL III: ICD-10-PCS | Mod: PBBFAC,,, | Performed by: INTERNAL MEDICINE

## 2019-03-19 PROCEDURE — 86255 FLUORESCENT ANTIBODY SCREEN: CPT

## 2019-03-19 PROCEDURE — 86140 C-REACTIVE PROTEIN: CPT

## 2019-03-19 PROCEDURE — 3077F SYST BP >= 140 MM HG: CPT | Mod: CPTII,S$GLB,, | Performed by: INTERNAL MEDICINE

## 2019-03-19 PROCEDURE — 1101F PT FALLS ASSESS-DOCD LE1/YR: CPT | Mod: CPTII,S$GLB,, | Performed by: INTERNAL MEDICINE

## 2019-03-19 PROCEDURE — 1101F PR PT FALLS ASSESS DOC 0-1 FALLS W/OUT INJ PAST YR: ICD-10-PCS | Mod: CPTII,S$GLB,, | Performed by: INTERNAL MEDICINE

## 2019-03-19 PROCEDURE — 3079F PR MOST RECENT DIASTOLIC BLOOD PRESSURE 80-89 MM HG: ICD-10-PCS | Mod: CPTII,S$GLB,, | Performed by: INTERNAL MEDICINE

## 2019-03-19 PROCEDURE — 99999 PR PBB SHADOW E&M-EST. PATIENT-LVL III: CPT | Mod: PBBFAC,,, | Performed by: INTERNAL MEDICINE

## 2019-03-19 NOTE — PATIENT INSTRUCTIONS

## 2019-03-19 NOTE — ASSESSMENT & PLAN NOTE
Etiology unclear.  Multifactorial etiology suspected.  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    EVALUATION:  ANALI  Sedimentation rate  C-reactive protein  Anti-neutrophilic cytoplasmic antibody  CT scan of chest.      PLAN:  OTC Antitussives   Avoid exposure to tobacco smoke and fumes.

## 2019-03-19 NOTE — PROGRESS NOTES
New patient    Subjective:      Patient ID: Grazyna Mccoy is a 78 y.o. female.    Patient Active Problem List   Diagnosis    Diabetes mellitus type 2, uncontrolled, with complications    Hypertension associated with diabetes    Depression    Anxiety    Vertigo    Vitamin D deficiency    Vitamin B12 deficiency    Hyperparathyroidism, unspecified    Special screening for malignant neoplasms, colon    Rotator cuff impingement syndrome of right shoulder    Acromioclavicular joint arthritis    Rotator cuff tear arthropathy of right shoulder    Tendinopathy of right shoulder    AC (acromioclavicular) arthritis    Bursitis of right shoulder    Statin intolerance    Hyperlipidemia associated with type 2 diabetes mellitus    Chronic cough    Urinary incontinence    Dyspnea on exertion    Skin lesions       Problem list has been reviewed.    she has been referred by Shobha Swift MD for evaluation and management for   Chief Complaint   Patient presents with    Cough       Chief Complaint: Cough      HPI:    She reports 2 - 3 episodes of bronchitis per year. She reports a chronic cough. She reports associated hoarseness, sneezing, rhinorrhea, nasal congestion.     She has been prescribed PROAIR,  WHICH SHE SELDOM USES, because it does not help.     She reports that she gets treated with STEROIDS intermittently by her PCP and that seems to help.      Patient denies difficulty breathing. Patient denies recent sick contacts.   Patient does not have new pets. Patient does not have a personal or family history of asthma. Her father was a heavy smoker. She grew up around cigarette smoke. Patient does not have a reported history of environmental allergens. Patient has not traveled recently. Patient does not have a history of smoking. Patient has had a previous chest x-ray.     Patient has had a PPD done.  PPD was Negative    She reports daytime somnolence.    EPWORTH SLEEPINESS SCALE    Sitting and  reading ____  2  Watching TV ____  1  Sitting inactive in a public place ____  1  Being a passenger in a motor vehicle for an hour or more ____  0  Lying down in the afternoon  ____  1  Sitting and talking to someone  ____  0  Sitting quietly after lunch (no alcohol) ____  1  Stopped for a few minutes in traffic while driving  ____  0    Total score   ____  6    Interpretation:  0-7: Normal  sleepiness  8-9: Average amount of daytime sleepiness.  10-15:Excessively sleepiness depending on the situation. Medical attention suggested.  16-24: Excessive sleepiness and medical attention recommended.    Reference: José Manuel BHAT. A new method for measuring daytime sleepiness: The [place]  Sleepiness Scale. Sleep 1991; 14(6):540-5.    Previous Report Reviewed: lab reports, office notes and radiology reports     Past Medical History: The following portions of the patient's history were reviewed and updated as appropriate:   She  has a past surgical history that includes Cataract extraction w/  intraocular lens implant (OU).  Her family history includes Thyroid disease in her maternal grandmother and mother.  She  reports that  has never smoked. she has never used smokeless tobacco. She reports that she does not drink alcohol or use drugs.  She has a current medication list which includes the following prescription(s): acetaminophen, albuterol, albuterol, aspirin, cholecalciferol (vitamin d3), cyanocobalamin, ezetimibe, fluticasone, inhalation spacing device, lorazepam, losartan, mirtazapine, neomycin-polymyxin-hydrocortisone, onetouch delica lancets, onetouch ultra blue test strip, and sertraline.  She is allergic to ciprofloxacin; codeine; septra  [sulfamethoxazole-trimethoprim]; and statins-hmg-coa reductase inhibitors..    Review of Systems   Constitutional: Positive for fatigue and night sweats. Negative for fever and chills.   HENT: Positive for nosebleeds, postnasal drip, rhinorrhea, sinus pressure, sore throat, voice  "change, congestion and ear pain. Negative for hearing loss.    Eyes: Positive for itching. Negative for redness.   Respiratory: Positive for snoring, cough, wheezing and somnolence. Negative for orthopnea, dyspnea on extertion and Paroxysmal Nocturnal Dyspnea.    Cardiovascular: Negative for chest pain, palpitations and leg swelling.   Genitourinary: Negative for difficulty urinating and hematuria.   Endocrine: Positive for cold intolerance and heat intolerance.    Musculoskeletal: Positive for arthralgias and back pain.   Skin: Negative for rash.   Gastrointestinal: Negative for nausea, vomiting, abdominal distention and acid reflux.   Neurological: Positive for dizziness. Negative for syncope, light-headedness and headaches.        Meniere's disease   Hematological: Negative for adenopathy. Does not bruise/bleed easily.   Psychiatric/Behavioral: The patient is nervous/anxious.    All other systems reviewed and are negative.         Occupational History:    Denies occupational exposure to asbestos, silica and petrochemicals.    Avocational Exposures:  none    Pet Exposures:  none      Objective:     Vitals:    03/19/19 0857   BP: (!) 160/83   Pulse: 80   Resp: 18   SpO2: 98%   Weight: 100.8 kg (222 lb 3.6 oz)   Height: 5' 7" (1.702 m)     Body mass index is 34.81 kg/m².    Physical Exam   Constitutional: She is oriented to person, place, and time. She appears well-developed and well-nourished. No distress.   HENT:   Head: Normocephalic and atraumatic.   Eyes: Conjunctivae and EOM are normal. Pupils are equal, round, and reactive to light. No scleral icterus.   Neck: Normal range of motion. Neck supple.   Cardiovascular: Normal rate and regular rhythm. Exam reveals no gallop and no friction rub.   No murmur heard.  Pulmonary/Chest: Effort normal and breath sounds normal.   Abdominal: Soft. Bowel sounds are normal.   Musculoskeletal: She exhibits no edema.   Neurological: She is alert and oriented to " person, place, and time. No cranial nerve deficit. Gait normal.   Skin: Skin is warm and dry. Capillary refill takes less than 2 seconds.   Psychiatric: She has a normal mood and affect.   Vitals reviewed.      Personal Diagnostic Review  CXR: 4/23/2018: Cardiac silhouette remains borderline enlarged.    The lungs are clear bilaterally.  No acute osseous findings demonstrated      Assessment /Plan:     Discussed diagnosis, its evaluation, treatment and usual course. All questions answered.    Problem List Items Addressed This Visit        Pulmonary    Chronic cough - Primary (Chronic)    Current Assessment & Plan     Etiology unclear.  Multifactorial etiology suspected.  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    EVALUATION:  ANALI  Sedimentation rate  C-reactive protein  Anti-neutrophilic cytoplasmic antibody  CT scan of chest.      PLAN:  OTC Antitussives   Avoid exposure to tobacco smoke and fumes.               Relevant Orders    ANALI Screen w/Reflex    Anti-neutrophilic cytoplasmic antibody    C-reactive protein    Sedimentation rate    Complete PFT with bronchodilator    PULM - Arterial Blood Gases--in addition to PFT only    Pulmonary stress test    CT Chest Without Contrast              TIME SPENT WITH PATIENT: Time spent: 60 minutes in face to face  discussion concerning diagnosis, prognosis, review of lab and test results, benefits of treatment as well as management of disease, counseling of patient and coordination of care between various health  care providers . Greater than half the time spent was used for  coordination of care and counseling of patient.     Follow-up in about 1 month (around 4/19/2019) for Cough.

## 2019-03-20 LAB — ANA SER QL IF: NORMAL

## 2019-03-21 LAB
ANCA AB TITR SER IF: NORMAL TITER
P-ANCA TITR SER IF: NORMAL TITER

## 2019-03-26 DIAGNOSIS — E11.59 HYPERTENSION ASSOCIATED WITH DIABETES: ICD-10-CM

## 2019-03-26 DIAGNOSIS — I15.2 HYPERTENSION ASSOCIATED WITH DIABETES: ICD-10-CM

## 2019-05-15 ENCOUNTER — CLINICAL SUPPORT (OUTPATIENT)
Dept: PULMONOLOGY | Facility: CLINIC | Age: 79
End: 2019-05-15
Payer: MEDICARE

## 2019-05-15 ENCOUNTER — HOSPITAL ENCOUNTER (OUTPATIENT)
Dept: RADIOLOGY | Facility: HOSPITAL | Age: 79
Discharge: HOME | End: 2019-05-15
Attending: INTERNAL MEDICINE
Payer: MEDICARE

## 2019-05-15 ENCOUNTER — OFFICE VISIT (OUTPATIENT)
Dept: PULMONOLOGY | Facility: CLINIC | Age: 79
End: 2019-05-15
Payer: MEDICARE

## 2019-05-15 VITALS
SYSTOLIC BLOOD PRESSURE: 156 MMHG | HEIGHT: 67 IN | HEIGHT: 67 IN | WEIGHT: 225.5 LBS | BODY MASS INDEX: 35.39 KG/M2 | OXYGEN SATURATION: 97 % | HEART RATE: 79 BPM | BODY MASS INDEX: 35.39 KG/M2 | DIASTOLIC BLOOD PRESSURE: 60 MMHG | WEIGHT: 225.5 LBS

## 2019-05-15 DIAGNOSIS — R05.3 CHRONIC COUGH: Chronic | ICD-10-CM

## 2019-05-15 DIAGNOSIS — R05.3 CHRONIC COUGH: Primary | Chronic | ICD-10-CM

## 2019-05-15 DIAGNOSIS — J47.9 BRONCHIECTASIS WITHOUT COMPLICATION: Chronic | ICD-10-CM

## 2019-05-15 LAB
ALLENS TEST: ABNORMAL
DELSYS: ABNORMAL
HCO3 UR-SCNC: 23.8 MMOL/L (ref 24–28)
MODE: ABNORMAL
PCO2 BLDA: 36.6 MMHG (ref 35–45)
PH SMN: 7.42 [PH] (ref 7.35–7.45)
PO2 BLDA: 74 MMHG (ref 80–100)
POC BE: -1 MMOL/L
POC SATURATED O2: 95 % (ref 95–100)
SAMPLE: ABNORMAL
SITE: ABNORMAL

## 2019-05-15 PROCEDURE — 99215 OFFICE O/P EST HI 40 MIN: CPT | Mod: S$GLB,,, | Performed by: INTERNAL MEDICINE

## 2019-05-15 PROCEDURE — 36600 PR WITHDRAWAL OF ARTERIAL BLOOD: ICD-10-PCS | Mod: S$GLB,,, | Performed by: INTERNAL MEDICINE

## 2019-05-15 PROCEDURE — 3077F SYST BP >= 140 MM HG: CPT | Mod: CPTII,S$GLB,, | Performed by: INTERNAL MEDICINE

## 2019-05-15 PROCEDURE — 3078F PR MOST RECENT DIASTOLIC BLOOD PRESSURE < 80 MM HG: ICD-10-PCS | Mod: CPTII,S$GLB,, | Performed by: INTERNAL MEDICINE

## 2019-05-15 PROCEDURE — 94618 PULMONARY STRESS TESTING: CPT | Mod: S$GLB,,, | Performed by: INTERNAL MEDICINE

## 2019-05-15 PROCEDURE — 3077F PR MOST RECENT SYSTOLIC BLOOD PRESSURE >= 140 MM HG: ICD-10-PCS | Mod: CPTII,S$GLB,, | Performed by: INTERNAL MEDICINE

## 2019-05-15 PROCEDURE — 1101F PT FALLS ASSESS-DOCD LE1/YR: CPT | Mod: CPTII,S$GLB,, | Performed by: INTERNAL MEDICINE

## 2019-05-15 PROCEDURE — 3078F DIAST BP <80 MM HG: CPT | Mod: CPTII,S$GLB,, | Performed by: INTERNAL MEDICINE

## 2019-05-15 PROCEDURE — 94618 PULMONARY STRESS TESTING: ICD-10-PCS | Mod: S$GLB,,, | Performed by: INTERNAL MEDICINE

## 2019-05-15 PROCEDURE — 99999 PR PBB SHADOW E&M-EST. PATIENT-LVL III: ICD-10-PCS | Mod: PBBFAC,,, | Performed by: INTERNAL MEDICINE

## 2019-05-15 PROCEDURE — 82803 PR  BLOOD GASES: PH, PO2 & PCO2: ICD-10-PCS | Mod: S$GLB,,, | Performed by: INTERNAL MEDICINE

## 2019-05-15 PROCEDURE — 99215 PR OFFICE/OUTPT VISIT, EST, LEVL V, 40-54 MIN: ICD-10-PCS | Mod: S$GLB,,, | Performed by: INTERNAL MEDICINE

## 2019-05-15 PROCEDURE — 1101F PR PT FALLS ASSESS DOC 0-1 FALLS W/OUT INJ PAST YR: ICD-10-PCS | Mod: CPTII,S$GLB,, | Performed by: INTERNAL MEDICINE

## 2019-05-15 PROCEDURE — 99999 PR PBB SHADOW E&M-EST. PATIENT-LVL III: CPT | Mod: PBBFAC,,, | Performed by: INTERNAL MEDICINE

## 2019-05-15 PROCEDURE — 82803 BLOOD GASES ANY COMBINATION: CPT | Mod: S$GLB,,, | Performed by: INTERNAL MEDICINE

## 2019-05-15 PROCEDURE — 71250 CT THORAX DX C-: CPT | Mod: TC

## 2019-05-15 PROCEDURE — 36600 WITHDRAWAL OF ARTERIAL BLOOD: CPT | Mod: S$GLB,,, | Performed by: INTERNAL MEDICINE

## 2019-05-15 NOTE — PROGRESS NOTES
Subjective:      Patient ID: Grazyna Mccoy is a 78 y.o. female.  Patient Active Problem List   Diagnosis    Diabetes mellitus type 2, uncontrolled, with complications    Hypertension associated with diabetes    Depression    Anxiety    Vertigo    Vitamin D deficiency    Vitamin B12 deficiency    Special screening for malignant neoplasms, colon    Rotator cuff impingement syndrome of right shoulder    Acromioclavicular joint arthritis    Rotator cuff tear arthropathy of right shoulder    Tendinopathy of right shoulder    AC (acromioclavicular) arthritis    Bursitis of right shoulder    Statin intolerance    Hyperlipidemia associated with type 2 diabetes mellitus    Chronic cough    Urinary incontinence    Dyspnea on exertion    Skin lesions    Bronchiectasis without complication     Problem list has been reviewed.    Chief Complaint: Cough      HPI      Labs, CT chest, 6MWT , ABG reviewed with patient who voiced understanding. PFT awaited.       Cough is intermittent. She reports associated hoarseness, sneezing, rhinorrhea, nasal congestion. Patients reports intermittent NYHA I dyspnea associated with wheezing.She reports that she gets treated with STEROIDS intermittently by her PCP and that seems to help.     She has been prescribed PROAIR,  WHICH SHE SELDOM USES, because it does not help.      The patient does not have currently have symptoms / an exacerbation.       No recent change in breathing.      A full  review of systems, past , family  and social histories was performed except as mentioned in the note above, these are non contributory to the main issues discussed today.      Previous Report Reviewed: office notes and radiology reports     The following portions of the patient's history were reviewed and updated as appropriate: She  has a past medical history of Anxiety, Carotid stenosis, Depression, Diabetes mellitus type II, Hyperlipidemia, Hyperparathyroidism, unspecified,  Hypertension, Leukopenia, Vertigo, Vitamin B12 deficiency, and Vitamin D deficiency.  She  has a past surgical history that includes Cataract extraction w/  intraocular lens implant (OU).  Her family history includes Thyroid disease in her maternal grandmother and mother.  She  reports that she has never smoked. She has never used smokeless tobacco. She reports that she does not drink alcohol or use drugs.  She has a current medication list which includes the following prescription(s): acetaminophen, albuterol, albuterol, aspirin, cholecalciferol (vitamin d3), ezetimibe, inhalation spacing device, lorazepam, losartan, mirtazapine, onetouch delica lancets, onetouch ultra blue test strip, sertraline, cyanocobalamin, fluticasone propionate, and neomycin-polymyxin-hydrocortisone.  She is allergic to ciprofloxacin; codeine; septra  [sulfamethoxazole-trimethoprim]; and statins-hmg-coa reductase inhibitors..    Review of Systems   Constitutional: Positive for fatigue and night sweats. Negative for fever and chills.   HENT: Positive for nosebleeds, postnasal drip, rhinorrhea, sinus pressure, sore throat, voice change, congestion and ear pain. Negative for hearing loss.    Eyes: Positive for itching. Negative for redness.   Respiratory: Positive for snoring, cough, wheezing and somnolence. Negative for orthopnea, dyspnea on extertion and Paroxysmal Nocturnal Dyspnea.    Cardiovascular: Negative for chest pain, palpitations and leg swelling.   Genitourinary: Negative for difficulty urinating and hematuria.   Endocrine: Positive for cold intolerance and heat intolerance.    Musculoskeletal: Positive for arthralgias and back pain.   Skin: Negative for rash.   Gastrointestinal: Negative for nausea, vomiting, abdominal distention and acid reflux.   Neurological: Positive for dizziness. Negative for syncope, light-headedness and headaches.        Meniere's disease   Hematological: Negative for adenopathy. Does not bruise/bleed  "easily.   Psychiatric/Behavioral: The patient is nervous/anxious.    All other systems reviewed and are negative.       Objective:   BP (!) 156/60   Pulse 79   Ht 5' 7" (1.702 m)   Wt 102.3 kg (225 lb 8.5 oz)   SpO2 97%   BMI 35.32 kg/m²   Body mass index is 35.32 kg/m².    Physical Exam   Constitutional: She is oriented to person, place, and time. She appears well-developed and well-nourished. No distress.   HENT:   Head: Normocephalic and atraumatic.   Eyes: Pupils are equal, round, and reactive to light. Conjunctivae and EOM are normal. No scleral icterus.   Neck: Normal range of motion. Neck supple.   Cardiovascular: Normal rate and regular rhythm. Exam reveals no gallop and no friction rub.   No murmur heard.  Pulmonary/Chest: Effort normal and breath sounds normal.   Abdominal: Soft. Bowel sounds are normal.   Musculoskeletal: She exhibits no edema.   Neurological: She is alert and oriented to person, place, and time. Abnormal reflex:  No cranial nerve deficit. Gait normal.   Skin: Skin is warm and dry. Capillary refill takes less than 2 seconds.   Psychiatric: She has a normal mood and affect.   Vitals reviewed.      Personal Diagnostic Review  ABG:   Results for orders placed or performed in visit on 05/15/19   ISTAT PROCEDURE   Result Value Ref Range    POC PH 7.422 7.35 - 7.45    POC PCO2 36.6 35 - 45 mmHg    POC PO2 74 (L) 80 - 100 mmHg    POC HCO3 23.8 (L) 24 - 28 mmol/L    POC BE -1 -2 to 2 mmol/L    POC SATURATED O2 95 95 - 100 %    Sample ARTERIAL     Site LR     Allens Test Pass     DelSys Room Air     Mode SPONT        Mild hypoxia. A-a gradient (alveolar-arterial gradient; AaG) elevated: [ 31.9 mmHg ]  Normal Acid Base Balance     expected pH = 7.43  expected CO2 = 38  expected HCO3- = 23      CT of chest performed on 05/15/19 without contrast:    Base of Neck: No significant abnormality.    Thoracic soft tissues: Normal.    Aorta: Left-sided aortic arch.  No aneurysm and no significant " atherosclerosis    Heart: Normal size. No effusion.    Pulmonary vasculature: Pulmonary arteries distribute normally.  There are four pulmonary veins.    Megha/Mediastinum: No pathologic mami enlargement.    Airways: Mild cylindrical bronchiectasis.    Lungs/Pleura: Clear lungs. No pleural effusion or thickening.    Esophagus: Normal.    Upper Abdomen: No abnormality of the partially imaged upper abdomen.    Bones: No acute fracture. No suspicious lytic or sclerotic lesions.         Six Minute Walk Test: Six minute walk distance is 320.04 / 352.58 meters (90.77 % predicted) with light dyspnea. Peak VO2 during walking was 13.58  Ml/min/kg [ 3.88 METS]. During exercise, there was no significant desaturation while breathing room air.  Both blood pressure and heart rate increased significantly with walking.  Hypertension was present prior to exercise.  This may represent a normal cardiovascular response to exercise.  The patient did not report non-pulmonary symptoms during exercise.  The patient did complete the study, walking 360 seconds of the 360 second test.  No previous study performed.  Based upon age and body mass index, exercise capacity is normal.      Sed Rate 0 - 20 mm/Hr 10      CRP 0.0 - 8.2 mg/L 7.8      Cytoplasmic Neutrophilic Ab <1:20 Titer <1:20      Perinuclear (P-ANCA) <1:20 Titer <1:20      ANALI Screen Negative <1:160 Negative <1:160            Assessment / plan :         Discussed diagnosis, its evaluation, treatment and usual course. All questions answered.    Problem List Items Addressed This Visit        Pulmonary    Chronic cough - Primary (Chronic)    Current Assessment & Plan     Etiology unclear.  Multifactorial etiology suspected.  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    EVALUATION:  PFT      PLAN:  OTC Antitussives   Avoid exposure to tobacco smoke and fumes.               Bronchiectasis without complication (Chronic)    Overview       CT of chest performed on 05/15/19 without contrast:    Base of Neck: No significant abnormality. Thoracic soft tissues: Normal. Aorta: Left-sided aortic arch.  No aneurysm and no significant atherosclerosis. Heart: Normal size. No effusion.   Pulmonary vasculature: Pulmonary arteries distribute normally.  There are four pulmonary veins.   Megha/Mediastinum: No pathologic mami enlargement. Airways: Mild cylindrical bronchiectasis.  Lungs/Pleura: Clear lungs. No pleural effusion or thickening. Esophagus: Normal.Upper Abdomen: No abnormality of the partially imaged upper abdomen. Bones: No acute fracture. No suspicious lytic or sclerotic lesions.           Current Assessment & Plan     Continued radiologic monitoring.                  TIME SPENT WITH PATIENT: Time spent: 40 minutes in face to face  discussion concerning diagnosis, prognosis, review of lab and test results, benefits of treatment as well as management of disease, counseling of patient and coordination of care between various health  care providers . Greater than half the time spent was used for coordination of care and counseling of patient.     Follow up in about 1 month (around 6/15/2019) for Cough.

## 2019-05-15 NOTE — PATIENT INSTRUCTIONS
Lung Anatomy  Your lungs take air in to give your body oxygen, which the body needs to work. Your lungs, like all the tissues in your body, are made up of billions of tiny specialized cells. Old lung cells die and are replaced by new, identical lung cells. This natural process helps ensure healthy lungs.    Date Last Reviewed: 11/1/2016  © 135[employer]-0328 [employer]. [address] [place] [address], [place] [address]. All rights reserved. This information is not intended as a substitute for professional medical care. Always follow your healthcare professional's instructions.

## 2019-05-15 NOTE — ASSESSMENT & PLAN NOTE
Etiology unclear.  Multifactorial etiology suspected.  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    EVALUATION:  PFT      PLAN:  OTC Antitussives   Avoid exposure to tobacco smoke and fumes.

## 2019-05-15 NOTE — PROCEDURES
"[facility]  Six Minute Walk     SUMMARY     Ordering Provider: Dr. Patricia   Interpreting Provider: Dr. Patricia  Performing nurse/tech/RT: CORRINA Redman CRT  Diagnosis: (Chronic cough)  Height: 5' 7" (170.2 cm)  Weight: 102.3 kg (225 lb 8.5 oz)  BMI (Calculated): 35.4   Patient Race:             Phase Oxygen Assessment Supplemental O2 Heart   Rate Blood Pressure Josiah Dyspnea Scale Rating   Resting 96 % Room Air 76 bpm 149/63 1   Exercise        Minute        1 95 % Room Air 100 bpm     2 95 % Room Air 105 bpm     3 96 % Room Air 106 bpm     4 97 % Room Air 104 bpm     5 96 % Room Air 108 bpm     6  96 % Room Air 100 bpm 166/72 3   Recovery        Minute        1 97 % Room Air 83 bpm     2 98 % Room Air 82 bpm     3 97 % Room Air 78 bpm     4 96 % Room Air 78 bpm 156/60 2     Six Minute Walk Summary  6MWT Status: completed without stopping  Patient Reported: No complaints     Interpretation:  Did the patient stop or pause?: No                                         Total Time Walked (Calculated): 360 seconds  Final Partial Lap Distance (feet): 50 feet  Total Distance Meters (Calculated): 320.04 meters  Predicted Distance Meters (Calculated): 352.58 meters  Percentage of Predicted (Calculated): 90.77  Peak VO2 (Calculated): 13.58  Mets: 3.88  Has The Patient Had a Previous Six Minute Walk Test?: No       Previous 6MWT Results  Has The Patient Had a Previous Six Minute Walk Test?: No        PHYSICIAN INTERPRETATION:    Six minute walk distance is 320.04 / 352.58 meters (90.77 % predicted) with light dyspnea. Peak VO2 during walking was 13.58  Ml/min/kg [ 3.88 METS]. During exercise, there was no significant desaturation while breathing room air.  Both blood pressure and heart rate increased significantly with walking.  Hypertension was present prior to exercise.  This may represent a normal cardiovascular response to exercise.  The patient did not report non-pulmonary symptoms during " exercise.  The patient did complete the study, walking 360 seconds of the 360 second test.  No previous study performed.  Based upon age and body mass index, exercise capacity is normal.

## 2019-05-15 NOTE — PROCEDURES
PHYSICIAN INTERPRETATION:    ISTAT PROCEDURE   Result Value Ref Range     POC PH 7.422 7.35 - 7.45     POC PCO2 36.6 35 - 45 mmHg     POC PO2 74 (L) 80 - 100 mmHg     POC HCO3 23.8 (L) 24 - 28 mmol/L     POC BE -1 -2 to 2 mmol/L     POC SATURATED O2 95 95 - 100 %     Sample ARTERIAL       Site LR       Allens Test Pass       DelSys Room Air       Mode SPONT           Mild hypoxia. A-a gradient (alveolar-arterial gradient; AaG) elevated: [ 31.9 mmHg ]  Normal Acid Base Balance      expected pH = 7.43  expected CO2 = 38  expected HCO3- = 23

## 2019-05-27 ENCOUNTER — LAB VISIT (OUTPATIENT)
Dept: LAB | Facility: HOSPITAL | Age: 79
End: 2019-05-27
Attending: FAMILY MEDICINE
Payer: MEDICARE

## 2019-05-27 DIAGNOSIS — I15.2 HYPERTENSION ASSOCIATED WITH DIABETES: ICD-10-CM

## 2019-05-27 DIAGNOSIS — E11.69 HYPERLIPIDEMIA ASSOCIATED WITH TYPE 2 DIABETES MELLITUS: ICD-10-CM

## 2019-05-27 DIAGNOSIS — E78.5 HYPERLIPIDEMIA ASSOCIATED WITH TYPE 2 DIABETES MELLITUS: ICD-10-CM

## 2019-05-27 DIAGNOSIS — E11.9 DIABETES MELLITUS TYPE 2, DIET-CONTROLLED: ICD-10-CM

## 2019-05-27 DIAGNOSIS — E11.59 HYPERTENSION ASSOCIATED WITH DIABETES: ICD-10-CM

## 2019-05-27 LAB
ALBUMIN SERPL BCP-MCNC: 3.7 G/DL (ref 3.5–5.2)
ALP SERPL-CCNC: 66 U/L (ref 55–135)
ALT SERPL W/O P-5'-P-CCNC: 11 U/L (ref 10–44)
ANION GAP SERPL CALC-SCNC: 9 MMOL/L (ref 8–16)
AST SERPL-CCNC: 17 U/L (ref 10–40)
BILIRUB SERPL-MCNC: 0.5 MG/DL (ref 0.1–1)
BUN SERPL-MCNC: 13 MG/DL (ref 8–23)
CALCIUM SERPL-MCNC: 9.1 MG/DL (ref 8.7–10.5)
CHLORIDE SERPL-SCNC: 104 MMOL/L (ref 95–110)
CHOLEST SERPL-MCNC: 151 MG/DL (ref 120–199)
CHOLEST/HDLC SERPL: 3.4 {RATIO} (ref 2–5)
CO2 SERPL-SCNC: 27 MMOL/L (ref 23–29)
CREAT SERPL-MCNC: 0.8 MG/DL (ref 0.5–1.4)
EST. GFR  (AFRICAN AMERICAN): >60 ML/MIN/1.73 M^2
EST. GFR  (NON AFRICAN AMERICAN): >60 ML/MIN/1.73 M^2
ESTIMATED AVG GLUCOSE: 160 MG/DL (ref 68–131)
GLUCOSE SERPL-MCNC: 139 MG/DL (ref 70–110)
HBA1C MFR BLD HPLC: 7.2 % (ref 4–5.6)
HDLC SERPL-MCNC: 44 MG/DL (ref 40–75)
HDLC SERPL: 29.1 % (ref 20–50)
LDLC SERPL CALC-MCNC: 84.6 MG/DL (ref 63–159)
NONHDLC SERPL-MCNC: 107 MG/DL
POTASSIUM SERPL-SCNC: 4.4 MMOL/L (ref 3.5–5.1)
PROT SERPL-MCNC: 7.1 G/DL (ref 6–8.4)
SODIUM SERPL-SCNC: 140 MMOL/L (ref 136–145)
TRIGL SERPL-MCNC: 112 MG/DL (ref 30–150)

## 2019-05-27 PROCEDURE — 80061 LIPID PANEL: CPT

## 2019-05-27 PROCEDURE — 83036 HEMOGLOBIN GLYCOSYLATED A1C: CPT

## 2019-05-27 PROCEDURE — 36415 COLL VENOUS BLD VENIPUNCTURE: CPT

## 2019-05-27 PROCEDURE — 80053 COMPREHEN METABOLIC PANEL: CPT

## 2019-07-03 ENCOUNTER — OFFICE VISIT (OUTPATIENT)
Dept: INTERNAL MEDICINE | Facility: CLINIC | Age: 79
End: 2019-07-03
Payer: MEDICARE

## 2019-07-03 VITALS
HEART RATE: 82 BPM | BODY MASS INDEX: 35.26 KG/M2 | HEIGHT: 67 IN | TEMPERATURE: 98 F | WEIGHT: 224.63 LBS | OXYGEN SATURATION: 96 % | DIASTOLIC BLOOD PRESSURE: 60 MMHG | SYSTOLIC BLOOD PRESSURE: 136 MMHG

## 2019-07-03 DIAGNOSIS — J47.9 BRONCHIECTASIS WITHOUT COMPLICATION: Primary | Chronic | ICD-10-CM

## 2019-07-03 DIAGNOSIS — J32.9 SINUSITIS, UNSPECIFIED CHRONICITY, UNSPECIFIED LOCATION: ICD-10-CM

## 2019-07-03 PROCEDURE — 99214 OFFICE O/P EST MOD 30 MIN: CPT | Mod: 25,S$GLB,, | Performed by: PHYSICIAN ASSISTANT

## 2019-07-03 PROCEDURE — 99999 PR PBB SHADOW E&M-EST. PATIENT-LVL IV: CPT | Mod: PBBFAC,,, | Performed by: PHYSICIAN ASSISTANT

## 2019-07-03 PROCEDURE — 3078F DIAST BP <80 MM HG: CPT | Mod: CPTII,S$GLB,, | Performed by: PHYSICIAN ASSISTANT

## 2019-07-03 PROCEDURE — 99214 PR OFFICE/OUTPT VISIT, EST, LEVL IV, 30-39 MIN: ICD-10-PCS | Mod: 25,S$GLB,, | Performed by: PHYSICIAN ASSISTANT

## 2019-07-03 PROCEDURE — 99999 PR PBB SHADOW E&M-EST. PATIENT-LVL IV: ICD-10-PCS | Mod: PBBFAC,,, | Performed by: PHYSICIAN ASSISTANT

## 2019-07-03 PROCEDURE — 3078F PR MOST RECENT DIASTOLIC BLOOD PRESSURE < 80 MM HG: ICD-10-PCS | Mod: CPTII,S$GLB,, | Performed by: PHYSICIAN ASSISTANT

## 2019-07-03 PROCEDURE — 1101F PR PT FALLS ASSESS DOC 0-1 FALLS W/OUT INJ PAST YR: ICD-10-PCS | Mod: CPTII,S$GLB,, | Performed by: PHYSICIAN ASSISTANT

## 2019-07-03 PROCEDURE — 96372 PR INJECTION,THERAP/PROPH/DIAG2ST, IM OR SUBCUT: ICD-10-PCS | Mod: S$GLB,,, | Performed by: PHYSICIAN ASSISTANT

## 2019-07-03 PROCEDURE — 3075F SYST BP GE 130 - 139MM HG: CPT | Mod: CPTII,S$GLB,, | Performed by: PHYSICIAN ASSISTANT

## 2019-07-03 PROCEDURE — 1101F PT FALLS ASSESS-DOCD LE1/YR: CPT | Mod: CPTII,S$GLB,, | Performed by: PHYSICIAN ASSISTANT

## 2019-07-03 PROCEDURE — 3075F PR MOST RECENT SYSTOLIC BLOOD PRESS GE 130-139MM HG: ICD-10-PCS | Mod: CPTII,S$GLB,, | Performed by: PHYSICIAN ASSISTANT

## 2019-07-03 PROCEDURE — 96372 THER/PROPH/DIAG INJ SC/IM: CPT | Mod: S$GLB,,, | Performed by: PHYSICIAN ASSISTANT

## 2019-07-03 RX ORDER — AZITHROMYCIN 250 MG/1
TABLET, FILM COATED ORAL
Qty: 6 TABLET | Refills: 0 | Status: SHIPPED | OUTPATIENT
Start: 2019-07-03 | End: 2019-09-03

## 2019-07-03 RX ORDER — METHYLPREDNISOLONE ACETATE 40 MG/ML
40 INJECTION, SUSPENSION INTRA-ARTICULAR; INTRALESIONAL; INTRAMUSCULAR; SOFT TISSUE ONCE
Status: COMPLETED | OUTPATIENT
Start: 2019-07-03 | End: 2019-07-03

## 2019-07-03 RX ADMIN — METHYLPREDNISOLONE ACETATE 40 MG: 40 INJECTION, SUSPENSION INTRA-ARTICULAR; INTRALESIONAL; INTRAMUSCULAR; SOFT TISSUE at 02:07

## 2019-07-03 NOTE — PROGRESS NOTES
Subjective:       Patient ID: Grazyna Mccoy is a 78 y.o. female.    Chief Complaint: URI (PCP - Jeniffer) and Cough    Sinus Problem   This is a new problem. The current episode started 1 to 4 weeks ago. The problem has been gradually worsening since onset. There has been no fever. The pain is moderate. Associated symptoms include chills, congestion, coughing, sinus pressure, sneezing, a sore throat and swollen glands. Pertinent negatives include no ear pain or shortness of breath. Past treatments include nothing.     Past Medical History:   Diagnosis Date    Anxiety     followed by Dr. Mabry    Carotid stenosis     Depression     followed by Dr. Mabry    Diabetes mellitus type II     Hyperlipidemia     Hyperparathyroidism, unspecified     Has appointment to establish with Dr. Gutierrez    Hypertension     Leukopenia     Vertigo     followed by ENT    Vitamin B12 deficiency     Vitamin D deficiency        Review of Systems   Constitutional: Positive for activity change, chills, fatigue and fever.   HENT: Positive for congestion, postnasal drip, rhinorrhea, sinus pressure, sinus pain, sneezing and sore throat. Negative for ear pain, hearing loss and trouble swallowing.    Respiratory: Positive for cough and wheezing. Negative for chest tightness and shortness of breath.    Cardiovascular: Negative for chest pain.   Gastrointestinal: Negative for abdominal pain, diarrhea, nausea and vomiting.       Objective:      Physical Exam   Constitutional: She appears well-developed and well-nourished. No distress.   HENT:   Head: Normocephalic and atraumatic.   Right Ear: Tympanic membrane and ear canal normal.   Left Ear: Tympanic membrane and ear canal normal.   Nose: Mucosal edema and rhinorrhea present. Right sinus exhibits maxillary sinus tenderness. Left sinus exhibits maxillary sinus tenderness.   Mouth/Throat: Uvula is midline and mucous membranes are normal. Oropharyngeal exudate, posterior oropharyngeal  edema and posterior oropharyngeal erythema present. No tonsillar exudate.   Neck: Neck supple.   Cardiovascular: Normal rate, regular rhythm and normal heart sounds. Exam reveals no gallop and no friction rub.   No murmur heard.  Pulmonary/Chest: Effort normal and breath sounds normal. No stridor. No respiratory distress. She has no wheezes. She has no rales. She exhibits no tenderness.   Abdominal: Soft. There is no tenderness.   Lymphadenopathy:     She has cervical adenopathy.   Skin: She is not diaphoretic.   Nursing note and vitals reviewed.      Assessment:       1. Bronchiectasis without complication    2. Diabetes mellitus type 2, uncontrolled, with complications    3. Sinusitis, unspecified chronicity, unspecified location        Plan:       Bronchiectasis without complication    Diabetes mellitus type 2, uncontrolled, with complications    Sinusitis, unspecified chronicity, unspecified location    Other orders  -     methylPREDNISolone acetate injection 40 mg  -     azithromycin (Z-ROSSY) 250 MG tablet; Follow instructions on pack.  Dispense: 6 tablet; Refill: 0

## 2019-07-19 ENCOUNTER — OFFICE VISIT (OUTPATIENT)
Dept: OPHTHALMOLOGY | Facility: CLINIC | Age: 79
End: 2019-07-19
Payer: MEDICARE

## 2019-07-19 ENCOUNTER — CLINICAL SUPPORT (OUTPATIENT)
Dept: PULMONOLOGY | Facility: CLINIC | Age: 79
End: 2019-07-19
Payer: MEDICARE

## 2019-07-19 DIAGNOSIS — E11.9 DIABETES MELLITUS TYPE 2 WITHOUT RETINOPATHY: Primary | ICD-10-CM

## 2019-07-19 DIAGNOSIS — R05.3 CHRONIC COUGH: Chronic | ICD-10-CM

## 2019-07-19 DIAGNOSIS — Z13.5 SCREENING FOR GLAUCOMA: ICD-10-CM

## 2019-07-19 DIAGNOSIS — Z96.1 PSEUDOPHAKIA OF BOTH EYES: ICD-10-CM

## 2019-07-19 DIAGNOSIS — H52.7 REFRACTIVE ERROR: ICD-10-CM

## 2019-07-19 LAB
BRPFT: ABNORMAL
DLCO ADJ PRE: 20.79 ML/(MIN*MMHG) (ref 16.49–27.95)
DLCO SINGLE BREATH LLN: 16.49
DLCO SINGLE BREATH PRE REF: 93.5 %
DLCO SINGLE BREATH REF: 22.22
DLCOC SBVA LLN: 2.8
DLCOC SBVA PRE REF: 119 %
DLCOC SBVA REF: 4.09
DLCOC SINGLE BREATH LLN: 16.49
DLCOC SINGLE BREATH PRE REF: 93.5 %
DLCOC SINGLE BREATH REF: 22.22
DLCOVA LLN: 2.8
DLCOVA PRE REF: 119 %
DLCOVA PRE: 4.87 ML/(MIN*MMHG*L) (ref 2.8–5.39)
DLCOVA REF: 4.09
DLVAADJ PRE: 4.87 ML/(MIN*MMHG*L) (ref 2.8–5.39)
ERV LLN: 0.59
ERV PRE REF: 54.7 %
ERV REF: 0.59
FEF 25 75 LLN: 0.75
FEF 25 75 PRE REF: 68.9 %
FEF 25 75 REF: 1.75
FEV1 FVC LLN: 63
FEV1 FVC PRE REF: 91.2 %
FEV1 FVC REF: 77
FEV1 LLN: 1.57
FEV1 PRE REF: 83.3 %
FEV1 REF: 2.21
FRCPLETH LLN: 2.06
FRCPLETH PREREF: 96 %
FRCPLETH REF: 2.88
FVC LLN: 2.07
FVC PRE REF: 90.3 %
FVC REF: 2.91
IVC PRE: 2.27 L (ref 2.07–3.76)
IVC SINGLE BREATH LLN: 2.07
IVC SINGLE BREATH PRE REF: 78.1 %
IVC SINGLE BREATH REF: 2.91
MVV LLN: 75
MVV PRE REF: 72.2 %
MVV REF: 89
PEF LLN: 3.75
PEF PRE REF: 94.5 %
PEF REF: 5.62
PRE DLCO: 20.79 ML/(MIN*MMHG) (ref 16.49–27.95)
PRE ERV: 0.32 L (ref 0.59–0.59)
PRE FEF 25 75: 1.21 L/S (ref 0.75–2.74)
PRE FET 100: 8.4 SEC
PRE FEV1 FVC: 70.14 % (ref 62.79–91.11)
PRE FEV1: 1.84 L (ref 1.57–2.86)
PRE FRC PL: 2.77 L
PRE FVC: 2.63 L (ref 2.07–3.76)
PRE MVV: 64 L/MIN (ref 75.31–101.89)
PRE PEF: 5.31 L/S (ref 3.75–7.49)
PRE RV: 2.39 L (ref 1.72–2.87)
PRE TLC: 5.02 L (ref 4.44–6.42)
RAW LLN: 3.06
RAW PRE REF: 198.4 %
RAW PRE: 6.07 CMH2O*S/L (ref 3.06–3.06)
RAW REF: 3.06
RV LLN: 1.72
RV PRE REF: 104.3 %
RV REF: 2.29
RVTLC LLN: 35
RVTLC PRE REF: 106.3 %
RVTLC PRE: 47.63 % (ref 35.21–54.39)
RVTLC REF: 45
TLC LLN: 4.44
TLC PRE REF: 92.4 %
TLC REF: 5.43
VA PRE: 4.27 L (ref 5.28–5.28)
VA SINGLE BREATH LLN: 5.28
VA SINGLE BREATH PRE REF: 80.9 %
VA SINGLE BREATH REF: 5.28
VC LLN: 2.07
VC PRE REF: 90.3 %
VC PRE: 2.63 L (ref 2.07–3.76)
VC REF: 2.91
VTGRAWPRE: 2.79 L

## 2019-07-19 PROCEDURE — 94726 PULM FUNCT TST PLETHYSMOGRAP: ICD-10-PCS | Mod: S$GLB,,, | Performed by: INTERNAL MEDICINE

## 2019-07-19 PROCEDURE — 92015 PR REFRACTION: ICD-10-PCS | Mod: S$GLB,,, | Performed by: OPTOMETRIST

## 2019-07-19 PROCEDURE — 99999 PR PBB SHADOW E&M-EST. PATIENT-LVL II: ICD-10-PCS | Mod: PBBFAC,,, | Performed by: OPTOMETRIST

## 2019-07-19 PROCEDURE — 94729 PR C02/MEMBANE DIFFUSE CAPACITY: ICD-10-PCS | Mod: S$GLB,,, | Performed by: INTERNAL MEDICINE

## 2019-07-19 PROCEDURE — 94729 DIFFUSING CAPACITY: CPT | Mod: S$GLB,,, | Performed by: INTERNAL MEDICINE

## 2019-07-19 PROCEDURE — 94726 PLETHYSMOGRAPHY LUNG VOLUMES: CPT | Mod: S$GLB,,, | Performed by: INTERNAL MEDICINE

## 2019-07-19 PROCEDURE — 99999 PR PBB SHADOW E&M-EST. PATIENT-LVL II: CPT | Mod: PBBFAC,,, | Performed by: OPTOMETRIST

## 2019-07-19 PROCEDURE — 94010 BREATHING CAPACITY TEST: CPT | Mod: S$GLB,,, | Performed by: INTERNAL MEDICINE

## 2019-07-19 PROCEDURE — 92014 COMPRE OPH EXAM EST PT 1/>: CPT | Mod: S$GLB,,, | Performed by: OPTOMETRIST

## 2019-07-19 PROCEDURE — 92015 DETERMINE REFRACTIVE STATE: CPT | Mod: S$GLB,,, | Performed by: OPTOMETRIST

## 2019-07-19 PROCEDURE — 94010 BREATHING CAPACITY TEST: ICD-10-PCS | Mod: S$GLB,,, | Performed by: INTERNAL MEDICINE

## 2019-07-19 PROCEDURE — 92014 PR EYE EXAM, EST PATIENT,COMPREHESV: ICD-10-PCS | Mod: S$GLB,,, | Performed by: OPTOMETRIST

## 2019-07-19 NOTE — PROGRESS NOTES
HPI     Last [facility] exam 07/13/2018  Diabetic/NIDDM  Pseudophakia, OU  Screening for glaucoma  RE      Last edited by Robert Tomas MA on 7/19/2019  9:56 AM. (History)            Assessment /Plan     For exam results, see Encounter Report.    Diabetes mellitus type 2 without retinopathy    Pseudophakia of both eyes    Screening for glaucoma    Refractive error      No diabetic retinopathy OU.  Stable PIOL OU.  OH OK OU.  Spec Rx given.  RTC one year or prn.

## 2019-08-08 DIAGNOSIS — I15.2 HYPERTENSION ASSOCIATED WITH DIABETES: ICD-10-CM

## 2019-08-08 DIAGNOSIS — E11.59 HYPERTENSION ASSOCIATED WITH DIABETES: ICD-10-CM

## 2019-08-08 DIAGNOSIS — E78.5 HYPERLIPIDEMIA ASSOCIATED WITH TYPE 2 DIABETES MELLITUS: ICD-10-CM

## 2019-08-08 DIAGNOSIS — E11.69 HYPERLIPIDEMIA ASSOCIATED WITH TYPE 2 DIABETES MELLITUS: ICD-10-CM

## 2019-08-08 RX ORDER — LOSARTAN POTASSIUM 50 MG/1
50 TABLET ORAL DAILY
Qty: 90 TABLET | Refills: 1 | Status: SHIPPED | OUTPATIENT
Start: 2019-08-08 | End: 2020-01-28

## 2019-08-08 RX ORDER — EZETIMIBE 10 MG/1
10 TABLET ORAL DAILY
Qty: 90 TABLET | Refills: 1 | Status: SHIPPED | OUTPATIENT
Start: 2019-08-08 | End: 2020-01-28

## 2019-08-13 ENCOUNTER — OFFICE VISIT (OUTPATIENT)
Dept: INTERNAL MEDICINE | Facility: CLINIC | Age: 79
End: 2019-08-13
Payer: MEDICARE

## 2019-08-13 VITALS
TEMPERATURE: 98 F | WEIGHT: 223.75 LBS | HEIGHT: 67 IN | BODY MASS INDEX: 35.12 KG/M2 | DIASTOLIC BLOOD PRESSURE: 78 MMHG | SYSTOLIC BLOOD PRESSURE: 142 MMHG | HEART RATE: 81 BPM | OXYGEN SATURATION: 96 %

## 2019-08-13 DIAGNOSIS — J01.90 ACUTE BACTERIAL SINUSITIS: ICD-10-CM

## 2019-08-13 DIAGNOSIS — H81.09 MENIERE'S DISEASE, UNSPECIFIED LATERALITY: Primary | ICD-10-CM

## 2019-08-13 DIAGNOSIS — M54.2 MUSCULOSKELETAL NECK PAIN: ICD-10-CM

## 2019-08-13 DIAGNOSIS — R35.0 URINARY FREQUENCY: ICD-10-CM

## 2019-08-13 DIAGNOSIS — R05.3 CHRONIC COUGH: Chronic | ICD-10-CM

## 2019-08-13 DIAGNOSIS — B96.89 ACUTE BACTERIAL SINUSITIS: ICD-10-CM

## 2019-08-13 PROCEDURE — 1101F PR PT FALLS ASSESS DOC 0-1 FALLS W/OUT INJ PAST YR: ICD-10-PCS | Mod: CPTII,S$GLB,, | Performed by: FAMILY MEDICINE

## 2019-08-13 PROCEDURE — 99999 PR PBB SHADOW E&M-EST. PATIENT-LVL IV: CPT | Mod: PBBFAC,,, | Performed by: FAMILY MEDICINE

## 2019-08-13 PROCEDURE — 99999 PR PBB SHADOW E&M-EST. PATIENT-LVL IV: ICD-10-PCS | Mod: PBBFAC,,, | Performed by: FAMILY MEDICINE

## 2019-08-13 PROCEDURE — 3078F PR MOST RECENT DIASTOLIC BLOOD PRESSURE < 80 MM HG: ICD-10-PCS | Mod: CPTII,S$GLB,, | Performed by: FAMILY MEDICINE

## 2019-08-13 PROCEDURE — 1101F PT FALLS ASSESS-DOCD LE1/YR: CPT | Mod: CPTII,S$GLB,, | Performed by: FAMILY MEDICINE

## 2019-08-13 PROCEDURE — 99214 PR OFFICE/OUTPT VISIT, EST, LEVL IV, 30-39 MIN: ICD-10-PCS | Mod: S$GLB,,, | Performed by: FAMILY MEDICINE

## 2019-08-13 PROCEDURE — 3078F DIAST BP <80 MM HG: CPT | Mod: CPTII,S$GLB,, | Performed by: FAMILY MEDICINE

## 2019-08-13 PROCEDURE — 3077F SYST BP >= 140 MM HG: CPT | Mod: CPTII,S$GLB,, | Performed by: FAMILY MEDICINE

## 2019-08-13 PROCEDURE — 99214 OFFICE O/P EST MOD 30 MIN: CPT | Mod: S$GLB,,, | Performed by: FAMILY MEDICINE

## 2019-08-13 PROCEDURE — 3077F PR MOST RECENT SYSTOLIC BLOOD PRESSURE >= 140 MM HG: ICD-10-PCS | Mod: CPTII,S$GLB,, | Performed by: FAMILY MEDICINE

## 2019-08-13 RX ORDER — AMOXICILLIN AND CLAVULANATE POTASSIUM 875; 125 MG/1; MG/1
1 TABLET, FILM COATED ORAL 2 TIMES DAILY
Qty: 20 TABLET | Refills: 0 | Status: SHIPPED | OUTPATIENT
Start: 2019-08-13 | End: 2019-08-23

## 2019-08-13 RX ORDER — TIZANIDINE 2 MG/1
2 TABLET ORAL EVERY 8 HOURS PRN
Qty: 20 TABLET | Refills: 0 | Status: SHIPPED | OUTPATIENT
Start: 2019-08-13 | End: 2019-08-23

## 2019-08-13 NOTE — PROGRESS NOTES
Subjective:       Patient ID: Grazyna Mccoy is a 79 y.o. female.    Chief Complaint: Neck Pain and Headache    Patient reports neck pain with associated headache since last Thursday. She also reports back ache and urinary frequency lately. She reports the neck pain is bilateral. Reports neck is tender to touch. Started like a crick in her neck. She has been taking 200 mg ibuprofen with good relief. Denies tingling/numbness/weakness associated with her neck pain. She reports continued cough since being treated for bronchitis about 1 month ago. It is somewhat better. She wants to see ENT for meniere's disease.    Review of Systems   Constitutional: Negative for chills, fatigue, fever and unexpected weight change.   HENT: Positive for congestion, hearing loss and sinus pressure.    Eyes: Negative for visual disturbance.   Respiratory: Positive for cough. Negative for shortness of breath.    Cardiovascular: Negative for chest pain.   Musculoskeletal: Positive for neck pain. Negative for myalgias.   Neurological: Positive for headaches.       Objective:      Physical Exam   Constitutional: She is oriented to person, place, and time. She appears well-developed and well-nourished. No distress.   HENT:   Head: Normocephalic and atraumatic.   Right Ear: Tympanic membrane and ear canal normal.   Left Ear: Tympanic membrane and ear canal normal.   Nose: Mucosal edema and rhinorrhea present. Right sinus exhibits maxillary sinus tenderness. Right sinus exhibits no frontal sinus tenderness. Left sinus exhibits maxillary sinus tenderness. Left sinus exhibits no frontal sinus tenderness.   Mouth/Throat: Oropharynx is clear and moist and mucous membranes are normal.   Beefy, red appearance to nasal mucosa right > left   Eyes: Pupils are equal, round, and reactive to light. Conjunctivae and EOM are normal.   Neck: Normal range of motion. Neck supple.   Cardiovascular: Normal rate and regular rhythm. Exam reveals no gallop and no  friction rub.   No murmur heard.  Pulmonary/Chest: Effort normal and breath sounds normal. No respiratory distress. She has no wheezes. She has no rales.   Lymphadenopathy:     She has no cervical adenopathy.   Neurological: She is alert and oriented to person, place, and time.   Skin: Skin is warm and dry. No rash noted.   Vitals reviewed.      Assessment:       1. Meniere's disease, unspecified laterality    2. Urinary frequency    3. Acute bacterial sinusitis    4. Musculoskeletal neck pain    5. Chronic cough        Plan:     Problem List Items Addressed This Visit     Chronic cough (Chronic)    Relevant Orders    Ambulatory referral to Pulmonology      Other Visit Diagnoses     Meniere's disease, unspecified laterality    -  Primary    Relevant Orders    Ambulatory referral to ENT    Urinary frequency        Relevant Orders    Urinalysis    Urine culture    Acute bacterial sinusitis        Relevant Medications    amoxicillin-clavulanate 875-125mg (AUGMENTIN) 875-125 mg per tablet    Musculoskeletal neck pain        Relevant Medications    tiZANidine (ZANAFLEX) 2 MG tablet        Advised follow up if any of the above worsen/persist. Patient expressed understanding.  Advised not to take muscle relaxer with ativan or remeron as all of these medications induce drowsiness and increase risk for falling. Patient expressed understanding.

## 2019-08-19 ENCOUNTER — LAB VISIT (OUTPATIENT)
Dept: LAB | Facility: HOSPITAL | Age: 79
End: 2019-08-19
Attending: FAMILY MEDICINE
Payer: MEDICARE

## 2019-08-19 DIAGNOSIS — E78.5 HYPERLIPIDEMIA ASSOCIATED WITH TYPE 2 DIABETES MELLITUS: ICD-10-CM

## 2019-08-19 DIAGNOSIS — E11.9 DIABETES MELLITUS TYPE 2, DIET-CONTROLLED: ICD-10-CM

## 2019-08-19 DIAGNOSIS — E53.8 VITAMIN B12 DEFICIENCY: ICD-10-CM

## 2019-08-19 DIAGNOSIS — E55.9 VITAMIN D DEFICIENCY: ICD-10-CM

## 2019-08-19 DIAGNOSIS — E11.59 HYPERTENSION ASSOCIATED WITH DIABETES: ICD-10-CM

## 2019-08-19 DIAGNOSIS — E11.69 HYPERLIPIDEMIA ASSOCIATED WITH TYPE 2 DIABETES MELLITUS: ICD-10-CM

## 2019-08-19 DIAGNOSIS — I15.2 HYPERTENSION ASSOCIATED WITH DIABETES: ICD-10-CM

## 2019-08-19 LAB
25(OH)D3+25(OH)D2 SERPL-MCNC: 30 NG/ML (ref 30–96)
ALBUMIN SERPL BCP-MCNC: 3.8 G/DL (ref 3.5–5.2)
ALP SERPL-CCNC: 69 U/L (ref 55–135)
ALT SERPL W/O P-5'-P-CCNC: 13 U/L (ref 10–44)
ANION GAP SERPL CALC-SCNC: 8 MMOL/L (ref 8–16)
AST SERPL-CCNC: 19 U/L (ref 10–40)
BASOPHILS # BLD AUTO: 0.05 K/UL (ref 0–0.2)
BASOPHILS NFR BLD: 0.7 % (ref 0–1.9)
BILIRUB SERPL-MCNC: 0.5 MG/DL (ref 0.1–1)
BUN SERPL-MCNC: 16 MG/DL (ref 8–23)
CALCIUM SERPL-MCNC: 9.4 MG/DL (ref 8.7–10.5)
CHLORIDE SERPL-SCNC: 101 MMOL/L (ref 95–110)
CHOLEST SERPL-MCNC: 152 MG/DL (ref 120–199)
CHOLEST/HDLC SERPL: 3 {RATIO} (ref 2–5)
CO2 SERPL-SCNC: 26 MMOL/L (ref 23–29)
CREAT SERPL-MCNC: 0.8 MG/DL (ref 0.5–1.4)
DIFFERENTIAL METHOD: NORMAL
EOSINOPHIL # BLD AUTO: 0.2 K/UL (ref 0–0.5)
EOSINOPHIL NFR BLD: 2.6 % (ref 0–8)
ERYTHROCYTE [DISTWIDTH] IN BLOOD BY AUTOMATED COUNT: 13 % (ref 11.5–14.5)
EST. GFR  (AFRICAN AMERICAN): >60 ML/MIN/1.73 M^2
EST. GFR  (NON AFRICAN AMERICAN): >60 ML/MIN/1.73 M^2
GLUCOSE SERPL-MCNC: 139 MG/DL (ref 70–110)
HCT VFR BLD AUTO: 37.9 % (ref 37–48.5)
HDLC SERPL-MCNC: 51 MG/DL (ref 40–75)
HDLC SERPL: 33.6 % (ref 20–50)
HGB BLD-MCNC: 12.5 G/DL (ref 12–16)
IMM GRANULOCYTES # BLD AUTO: 0.03 K/UL (ref 0–0.04)
IMM GRANULOCYTES NFR BLD AUTO: 0.4 % (ref 0–0.5)
LDLC SERPL CALC-MCNC: 82 MG/DL (ref 63–159)
LYMPHOCYTES # BLD AUTO: 2.1 K/UL (ref 1–4.8)
LYMPHOCYTES NFR BLD: 27.6 % (ref 18–48)
MCH RBC QN AUTO: 29.1 PG (ref 27–31)
MCHC RBC AUTO-ENTMCNC: 33 G/DL (ref 32–36)
MCV RBC AUTO: 88 FL (ref 82–98)
MONOCYTES # BLD AUTO: 0.5 K/UL (ref 0.3–1)
MONOCYTES NFR BLD: 6.2 % (ref 4–15)
NEUTROPHILS # BLD AUTO: 4.8 K/UL (ref 1.8–7.7)
NEUTROPHILS NFR BLD: 62.5 % (ref 38–73)
NONHDLC SERPL-MCNC: 101 MG/DL
NRBC BLD-RTO: 0 /100 WBC
PLATELET # BLD AUTO: 320 K/UL (ref 150–350)
PMV BLD AUTO: 10.2 FL (ref 9.2–12.9)
POTASSIUM SERPL-SCNC: 4.1 MMOL/L (ref 3.5–5.1)
PROT SERPL-MCNC: 7.1 G/DL (ref 6–8.4)
RBC # BLD AUTO: 4.29 M/UL (ref 4–5.4)
SODIUM SERPL-SCNC: 135 MMOL/L (ref 136–145)
TRIGL SERPL-MCNC: 95 MG/DL (ref 30–150)
TSH SERPL DL<=0.005 MIU/L-ACNC: 1.94 UIU/ML (ref 0.4–4)
VIT B12 SERPL-MCNC: 326 PG/ML (ref 210–950)
WBC # BLD AUTO: 7.69 K/UL (ref 3.9–12.7)

## 2019-08-19 PROCEDURE — 82607 VITAMIN B-12: CPT

## 2019-08-19 PROCEDURE — 80053 COMPREHEN METABOLIC PANEL: CPT

## 2019-08-19 PROCEDURE — 85025 COMPLETE CBC W/AUTO DIFF WBC: CPT

## 2019-08-19 PROCEDURE — 83036 HEMOGLOBIN GLYCOSYLATED A1C: CPT

## 2019-08-19 PROCEDURE — 36415 COLL VENOUS BLD VENIPUNCTURE: CPT

## 2019-08-19 PROCEDURE — 84443 ASSAY THYROID STIM HORMONE: CPT

## 2019-08-19 PROCEDURE — 82306 VITAMIN D 25 HYDROXY: CPT

## 2019-08-19 PROCEDURE — 80061 LIPID PANEL: CPT

## 2019-08-20 LAB
ESTIMATED AVG GLUCOSE: 169 MG/DL (ref 68–131)
HBA1C MFR BLD HPLC: 7.5 % (ref 4–5.6)

## 2019-08-26 ENCOUNTER — OFFICE VISIT (OUTPATIENT)
Dept: INTERNAL MEDICINE | Facility: CLINIC | Age: 79
End: 2019-08-26
Payer: MEDICARE

## 2019-08-26 ENCOUNTER — HOSPITAL ENCOUNTER (OUTPATIENT)
Dept: RADIOLOGY | Facility: HOSPITAL | Age: 79
Discharge: HOME | End: 2019-08-26
Attending: FAMILY MEDICINE
Payer: MEDICARE

## 2019-08-26 VITALS
OXYGEN SATURATION: 97 % | SYSTOLIC BLOOD PRESSURE: 142 MMHG | WEIGHT: 222.25 LBS | HEART RATE: 88 BPM | BODY MASS INDEX: 34.81 KG/M2 | DIASTOLIC BLOOD PRESSURE: 60 MMHG

## 2019-08-26 DIAGNOSIS — F41.9 ANXIETY: ICD-10-CM

## 2019-08-26 DIAGNOSIS — E55.9 VITAMIN D DEFICIENCY: ICD-10-CM

## 2019-08-26 DIAGNOSIS — Z78.9 STATIN INTOLERANCE: ICD-10-CM

## 2019-08-26 DIAGNOSIS — F32.A DEPRESSION, UNSPECIFIED DEPRESSION TYPE: ICD-10-CM

## 2019-08-26 DIAGNOSIS — E11.69 HYPERLIPIDEMIA ASSOCIATED WITH TYPE 2 DIABETES MELLITUS: ICD-10-CM

## 2019-08-26 DIAGNOSIS — M47.812 OSTEOARTHRITIS OF CERVICAL SPINE, UNSPECIFIED SPINAL OSTEOARTHRITIS COMPLICATION STATUS: ICD-10-CM

## 2019-08-26 DIAGNOSIS — I65.22 CAROTID ARTERY CALCIFICATION, LEFT: ICD-10-CM

## 2019-08-26 DIAGNOSIS — E78.5 HYPERLIPIDEMIA ASSOCIATED WITH TYPE 2 DIABETES MELLITUS: ICD-10-CM

## 2019-08-26 DIAGNOSIS — I15.2 HYPERTENSION ASSOCIATED WITH DIABETES: ICD-10-CM

## 2019-08-26 DIAGNOSIS — E53.8 VITAMIN B12 DEFICIENCY: ICD-10-CM

## 2019-08-26 DIAGNOSIS — E11.59 HYPERTENSION ASSOCIATED WITH DIABETES: ICD-10-CM

## 2019-08-26 DIAGNOSIS — M54.2 NECK PAIN: ICD-10-CM

## 2019-08-26 DIAGNOSIS — Z00.00 ROUTINE GENERAL MEDICAL EXAMINATION AT A HEALTH CARE FACILITY: Primary | ICD-10-CM

## 2019-08-26 PROCEDURE — 72040 XR CERVICAL SPINE AP LATERAL: ICD-10-PCS | Mod: 26,,, | Performed by: RADIOLOGY

## 2019-08-26 PROCEDURE — 3077F SYST BP >= 140 MM HG: CPT | Mod: CPTII,S$GLB,, | Performed by: FAMILY MEDICINE

## 2019-08-26 PROCEDURE — 99214 OFFICE O/P EST MOD 30 MIN: CPT | Mod: S$GLB,,, | Performed by: FAMILY MEDICINE

## 2019-08-26 PROCEDURE — 72040 X-RAY EXAM NECK SPINE 2-3 VW: CPT | Mod: 26,,, | Performed by: RADIOLOGY

## 2019-08-26 PROCEDURE — 99214 PR OFFICE/OUTPT VISIT, EST, LEVL IV, 30-39 MIN: ICD-10-PCS | Mod: S$GLB,,, | Performed by: FAMILY MEDICINE

## 2019-08-26 PROCEDURE — 3078F PR MOST RECENT DIASTOLIC BLOOD PRESSURE < 80 MM HG: ICD-10-PCS | Mod: CPTII,S$GLB,, | Performed by: FAMILY MEDICINE

## 2019-08-26 PROCEDURE — 99999 PR PBB SHADOW E&M-EST. PATIENT-LVL IV: CPT | Mod: PBBFAC,,, | Performed by: FAMILY MEDICINE

## 2019-08-26 PROCEDURE — 3077F PR MOST RECENT SYSTOLIC BLOOD PRESSURE >= 140 MM HG: ICD-10-PCS | Mod: CPTII,S$GLB,, | Performed by: FAMILY MEDICINE

## 2019-08-26 PROCEDURE — 72040 X-RAY EXAM NECK SPINE 2-3 VW: CPT | Mod: TC

## 2019-08-26 PROCEDURE — 99999 PR PBB SHADOW E&M-EST. PATIENT-LVL IV: ICD-10-PCS | Mod: PBBFAC,,, | Performed by: FAMILY MEDICINE

## 2019-08-26 PROCEDURE — 3078F DIAST BP <80 MM HG: CPT | Mod: CPTII,S$GLB,, | Performed by: FAMILY MEDICINE

## 2019-08-26 NOTE — PROGRESS NOTES
Subjective:       Patient ID: Grazyna Mccoy is a 79 y.o. female.    Chief Complaint: Annual Exam    Patient presents to clinic today for annual physical exam. She complains of neck pain today. She reports it is difficult to drive due to limited range of motion from muscular pain.    Review of Systems   Constitutional: Negative for activity change and unexpected weight change.   HENT: Positive for rhinorrhea. Negative for hearing loss and trouble swallowing.    Eyes: Negative for discharge and visual disturbance.   Respiratory: Negative for chest tightness and wheezing.    Cardiovascular: Negative for chest pain and palpitations.   Gastrointestinal: Negative for blood in stool, constipation, diarrhea and vomiting.   Endocrine: Negative for polydipsia and polyuria.   Genitourinary: Negative for difficulty urinating, dysuria, hematuria and menstrual problem.   Musculoskeletal: Positive for neck pain. Negative for arthralgias and joint swelling.   Neurological: Positive for weakness and headaches.   Psychiatric/Behavioral: Negative for confusion and dysphoric mood.       Objective:      Physical Exam   Constitutional: She is oriented to person, place, and time. Vital signs are normal. She appears well-developed and well-nourished. No distress.   HENT:   Head: Normocephalic and atraumatic.   Right Ear: Tympanic membrane, external ear and ear canal normal.   Left Ear: Tympanic membrane, external ear and ear canal normal.   Nose: Nose normal. No mucosal edema or rhinorrhea.   Mouth/Throat: Uvula is midline, oropharynx is clear and moist and mucous membranes are normal.   Eyes: Pupils are equal, round, and reactive to light. Conjunctivae, EOM and lids are normal.   Neck: Neck supple. Muscular tenderness present. No spinous process tenderness present. Decreased range of motion present. No thyromegaly present.   Cardiovascular: Normal rate and regular rhythm. Exam reveals no gallop and no friction rub.   No murmur  heard.  Pulmonary/Chest: Effort normal and breath sounds normal. She has no wheezes. She has no rhonchi. She has no rales.   Abdominal: Soft. Normal appearance and bowel sounds are normal. She exhibits no distension and no mass. There is no hepatosplenomegaly. There is no tenderness.   Lymphadenopathy:     She has no cervical adenopathy.   Neurological: She is alert and oriented to person, place, and time. She has normal strength. No cranial nerve deficit or sensory deficit. Gait normal.   Reflex Scores:       Patellar reflexes are 2+ on the right side and 2+ on the left side.  Skin: Skin is warm and dry. No lesion and no rash noted. No cyanosis. Nails show no clubbing.   Psychiatric: She has a normal mood and affect.   Vitals reviewed.        Protective Sensation (w/ 10 gram monofilament):  Right: Intact  Left: Intact    Visual Inspection:  Normal -  Bilateral    Pedal Pulses:   Right: Present  Left: Present    Posterior tibialis:   Right:Present  Left: Present        Assessment:       1. Routine general medical examination at a health care facility    2. Neck pain    3. Carotid artery calcification, left    4. Osteoarthritis of cervical spine, unspecified spinal osteoarthritis complication status    5. Hypertension associated with diabetes    6. Hyperlipidemia associated with type 2 diabetes mellitus    7. Statin intolerance    8. Diabetes mellitus type 2, uncontrolled, with complications    9. Vitamin B12 deficiency    10. Vitamin D deficiency    11. Depression, unspecified depression type    12. Anxiety        Plan:     Problem List Items Addressed This Visit     Anxiety    Overview     followed by Dr. Mabry         Depression    Overview     followed by Dr. Mabry         Diabetes mellitus type 2, uncontrolled, with complications    Current Assessment & Plan     a1c 7.5; patient will work on diet; previously diet controlled         Hyperlipidemia associated with type 2 diabetes mellitus    Current Assessment  & Plan     LDL 82, continue zetia         Hypertension associated with diabetes    Current Assessment & Plan     Elevated today, likely secondary to neck pain; reports home readings 142/60; will monitor         Statin intolerance    Vitamin B12 deficiency    Current Assessment & Plan     Controlled, continue supplement         Vitamin D deficiency    Current Assessment & Plan     Controlled, continue supplement             Other Visit Diagnoses     Routine general medical examination at a health care facility    -  Primary    Neck pain        Relevant Orders    X-Ray Cervical Spine AP And Lateral (Completed)    Carotid artery calcification, left        Relevant Orders    Cardiology Lab Carotid US Bilateral    Osteoarthritis of cervical spine, unspecified spinal osteoarthritis complication status        Relevant Orders    Ambulatory referral to Pain Clinic          Health Maintenance reviewed/updated.

## 2019-08-30 ENCOUNTER — TELEPHONE (OUTPATIENT)
Dept: OTOLARYNGOLOGY | Facility: CLINIC | Age: 79
End: 2019-08-30

## 2019-08-30 NOTE — TELEPHONE ENCOUNTER
Pt scheduled for audiogram prior to her appointment with us.          ----- Message from Art Solo MD sent at 8/30/2019  9:49 AM CDT -----  Pt is coming in Tuesday for Meniere's, she needs an audio before appt, please schedule. Thank you!

## 2019-09-03 ENCOUNTER — CLINICAL SUPPORT (OUTPATIENT)
Dept: AUDIOLOGY | Facility: CLINIC | Age: 79
End: 2019-09-03
Payer: MEDICARE

## 2019-09-03 ENCOUNTER — OFFICE VISIT (OUTPATIENT)
Dept: OTOLARYNGOLOGY | Facility: CLINIC | Age: 79
End: 2019-09-03
Payer: MEDICARE

## 2019-09-03 VITALS
DIASTOLIC BLOOD PRESSURE: 70 MMHG | TEMPERATURE: 97 F | SYSTOLIC BLOOD PRESSURE: 161 MMHG | WEIGHT: 220 LBS | HEART RATE: 65 BPM | BODY MASS INDEX: 34.46 KG/M2

## 2019-09-03 DIAGNOSIS — R05.3 CHRONIC COUGH: Primary | Chronic | ICD-10-CM

## 2019-09-03 DIAGNOSIS — H81.01: ICD-10-CM

## 2019-09-03 DIAGNOSIS — H90.3 SENSORINEURAL HEARING LOSS (SNHL) OF BOTH EARS: ICD-10-CM

## 2019-09-03 PROCEDURE — 31575 PR LARYNGOSCOPY, FLEXIBLE; DIAGNOSTIC: ICD-10-PCS | Mod: S$GLB,,, | Performed by: OTOLARYNGOLOGY

## 2019-09-03 PROCEDURE — 99214 OFFICE O/P EST MOD 30 MIN: CPT | Mod: 25,S$GLB,, | Performed by: OTOLARYNGOLOGY

## 2019-09-03 PROCEDURE — 99999 PR PBB SHADOW E&M-EST. PATIENT-LVL III: ICD-10-PCS | Mod: PBBFAC,,, | Performed by: OTOLARYNGOLOGY

## 2019-09-03 PROCEDURE — 1101F PR PT FALLS ASSESS DOC 0-1 FALLS W/OUT INJ PAST YR: ICD-10-PCS | Mod: CPTII,S$GLB,, | Performed by: OTOLARYNGOLOGY

## 2019-09-03 PROCEDURE — 92557 COMPREHENSIVE HEARING TEST: CPT | Mod: S$GLB,,, | Performed by: AUDIOLOGIST

## 2019-09-03 PROCEDURE — 92567 TYMPANOMETRY: CPT | Mod: S$GLB,,, | Performed by: AUDIOLOGIST

## 2019-09-03 PROCEDURE — 1101F PT FALLS ASSESS-DOCD LE1/YR: CPT | Mod: CPTII,S$GLB,, | Performed by: OTOLARYNGOLOGY

## 2019-09-03 PROCEDURE — 3077F SYST BP >= 140 MM HG: CPT | Mod: CPTII,S$GLB,, | Performed by: OTOLARYNGOLOGY

## 2019-09-03 PROCEDURE — 92557 PR COMPREHENSIVE HEARING TEST: ICD-10-PCS | Mod: S$GLB,,, | Performed by: AUDIOLOGIST

## 2019-09-03 PROCEDURE — 3078F DIAST BP <80 MM HG: CPT | Mod: CPTII,S$GLB,, | Performed by: OTOLARYNGOLOGY

## 2019-09-03 PROCEDURE — 92567 PR TYMPA2METRY: ICD-10-PCS | Mod: S$GLB,,, | Performed by: AUDIOLOGIST

## 2019-09-03 PROCEDURE — 31575 DIAGNOSTIC LARYNGOSCOPY: CPT | Mod: S$GLB,,, | Performed by: OTOLARYNGOLOGY

## 2019-09-03 PROCEDURE — 3078F PR MOST RECENT DIASTOLIC BLOOD PRESSURE < 80 MM HG: ICD-10-PCS | Mod: CPTII,S$GLB,, | Performed by: OTOLARYNGOLOGY

## 2019-09-03 PROCEDURE — 99214 PR OFFICE/OUTPT VISIT, EST, LEVL IV, 30-39 MIN: ICD-10-PCS | Mod: 25,S$GLB,, | Performed by: OTOLARYNGOLOGY

## 2019-09-03 PROCEDURE — 3077F PR MOST RECENT SYSTOLIC BLOOD PRESSURE >= 140 MM HG: ICD-10-PCS | Mod: CPTII,S$GLB,, | Performed by: OTOLARYNGOLOGY

## 2019-09-03 PROCEDURE — 99999 PR PBB SHADOW E&M-EST. PATIENT-LVL III: CPT | Mod: PBBFAC,,, | Performed by: OTOLARYNGOLOGY

## 2019-09-03 NOTE — LETTER
September 3, 2019      Shobha Swift MD  [address] [place] [address] [address] [place] [address]           [facility]  [address] [place] [address] [place] [address]  Phone: [phone]  Fax: [phone]          Patient: Grazyna Mccoy   MR Number: [id number]   YOB: 1940   Date of Visit: 9/3/2019       Dear Dr. Shobha Swift:    Thank you for referring Grazyna Mccoy to me for evaluation. Attached you will find relevant portions of my assessment and plan of care.    If you have questions, please do not hesitate to call me. I look forward to following Grazyna Mccoy along with you.    Sincerely,    Art Solo MD    Enclosure  CC:  No Recipients    If you would like to receive this communication electronically, please contact [email or web address] or [phone] to request more information on [employer] Link access.    For providers and/or their staff who would like to refer a patient to [facility], please contact us through our one-stop-shop provider referral line, [facility] , at [phone].    If you feel you have received this communication in error or would no longer like to receive these types of communications, please e-mail [email or web address]

## 2019-09-03 NOTE — PROGRESS NOTES
Referring Provider:    Shobha Swift Md  [address] [place] [address]  Subjective:   Patient: Grazyna Mccoy [id number], :1940   Visit date:9/3/2019 9:03 AM    Chief Complaint:  Other (review audio)    HPI:  Grazyna is a 79 y.o. female who I was asked to see in consultation for evaluation of the following issue(s):    She has history of Meniere's AD but says no vertigo since ; previously seen by Dr. Benitez (neurotology).  Has known hearing loss AD; Feels that left is slowly decreasing.  No drainage or pain.  Compliant with low salt diet.     She has had a cough lasting the past few months.  Concerned that it is sinus related as she was having a bad headache and neck pain.  No nasal drainage.  Significant congestion. Saw [facility] in March but had conflicting appts for follow up.  CT at that point demonstrated mild bronchiectasis. Denies reflux symptoms.       Review of Systems:  -     Allergic/Immunologic: is allergic to ciprofloxacin; codeine; septra  [sulfamethoxazole-trimethoprim]; and statins-hmg-coa reductase inhibitors..  -     Constitutional: Current temp: 97.4 °F (36.3 °C) (Tympanic)      Her meds, allergies, medical, surgical, social & family histories were reviewed & updated:  -     She has a current medication list which includes the following prescription(s): aspirin, cholecalciferol (vitamin d3), ezetimibe, lorazepam, losartan, mirtazapine, onetouch delica lancets, onetouch ultra blue test strip, and sertraline.  -     She  has a past medical history of Anxiety, Bronchitis, Carotid stenosis, Depression, Diabetes mellitus type II, Hyperlipidemia, Hyperparathyroidism, unspecified, Hypertension, Leukopenia, Vertigo, Vitamin B12 deficiency, and Vitamin D deficiency.   -     She does not have any pertinent problems on file.   -     She  has a past surgical history that includes Cataract extraction w/  intraocular lens implant (OU).  -     She  reports that she has never smoked. She  has never used smokeless tobacco. She reports that she does not drink alcohol or use drugs.  -     Her family history includes Thyroid disease in her maternal grandmother and mother.  -     She is allergic to ciprofloxacin; codeine; septra  [sulfamethoxazole-trimethoprim]; and statins-hmg-coa reductase inhibitors.    Objective:     Physical Exam: (abnormal findings indicated in BOLD)    Physical Exam:  Vitals:  BP (!) 161/70   Pulse 65   Temp 97.4 °F (36.3 °C) (Tympanic)   Wt 99.8 kg (220 lb)   BMI 34.46 kg/m²   General appearance:  Well developed, well nourished    Eyes:  Extraocular motions intact, PERRL    Communication:  no hoarseness, no dysphonia    Ears:  Otoscopy of external auditory canals and tympanic membranes was normal, clinical speech reception thresholds grossly intact, no mass/lesion of auricle.  Nose:  No masses/lesions of external nose, nasal mucosa, septum, and turbinates were within normal limits.  Mouth:  No mass/lesion of lips, teeth, gums, hard/soft palate, tongue, tonsils, or oropharynx.    Cardiovascular:  No pedal edema; Radial Pulses +2     Neck & Lymphatics:  No cervical lymphadenopathy, no neck mass/crepitus/ asymmetry, trachea is midline, no thyroid enlargement/tenderness/mass.    Psych: Oriented x3,  Alert with normal mood and affect.     Respiration/Chest:  Symmetric expansion during respiration, normal respiratory effort.    Skin:  Warm and intact. No ulcerations of face, scalp, neck.        Assessment & Plan:       Hearing loss- recommend trial of hearing aids bilaterally.      Meniere's- no vertigo; continue low salt diet    Cough- no evidence of sinonasal pathology.  Prior imaging concerning for pulmonary process.  Encouraged to follow up with Dr. Patricia.         Patient: Grazyna Mccoy [id number], :1940  Procedure date:9/3/2019  Patient's medications, allergies, past medical, surgical, social and family histories were reviewed and updated as appropriate.  Chief Complaint:   Other (review audio)    HPI:  Grazyna is a 79 y.o. female with the history of present illness as discussed in the clinic note from today.    Procedure: Risks, benefits, and alternatives of the procedure were discussed with the patient, and the patient consented to the fiberoptic examination.  We applied a topical nasal decongestant and analgesic.  After adequate anesthesia was obtained, the flexible fiberoptic scope was passed into each nostril independently.  Each nasal cavity, the entire pharynx (nasopharynx to hypopharynx) and the larynx were visualized. At the end of the examination, the scope was removed. The patient tolerated the procedure well with no complications.     Findings:  -     Laryngeal mucosa is normal  -     Posterior commissure has mild hypertrophy  -     Lingual tonsils have no hypertrophy  -     Adenoids have no  hypertrophy  -     Right vocal fold: normal mobility     mass/lesion: none  -     Left vocal fold: normal mobility     mass/lesion: none  -     Other findings: none    Assessment & Plan:  - see today's clinic note

## 2019-09-09 ENCOUNTER — CLINICAL SUPPORT (OUTPATIENT)
Dept: CARDIOLOGY | Facility: CLINIC | Age: 79
End: 2019-09-09
Attending: FAMILY MEDICINE
Payer: MEDICARE

## 2019-09-09 DIAGNOSIS — I65.22 CAROTID ARTERY CALCIFICATION, LEFT: ICD-10-CM

## 2019-09-09 LAB — INTERNAL CAROTID STENOSIS: NORMAL

## 2019-09-09 PROCEDURE — 93880 CAR US DOPPLER CAROTID BILATERAL: ICD-10-PCS | Mod: S$GLB,,, | Performed by: INTERNAL MEDICINE

## 2019-09-09 PROCEDURE — 93880 EXTRACRANIAL BILAT STUDY: CPT | Mod: S$GLB,,, | Performed by: INTERNAL MEDICINE

## 2019-09-10 PROBLEM — I65.23 CAROTID STENOSIS, ASYMPTOMATIC, BILATERAL: Status: ACTIVE | Noted: 2019-09-10

## 2019-09-19 ENCOUNTER — OFFICE VISIT (OUTPATIENT)
Dept: PAIN MEDICINE | Facility: CLINIC | Age: 79
End: 2019-09-19
Payer: MEDICARE

## 2019-09-19 VITALS
HEIGHT: 67 IN | BODY MASS INDEX: 34.53 KG/M2 | SYSTOLIC BLOOD PRESSURE: 155 MMHG | DIASTOLIC BLOOD PRESSURE: 80 MMHG | WEIGHT: 220 LBS | HEART RATE: 75 BPM

## 2019-09-19 DIAGNOSIS — M47.812 SPONDYLOSIS OF CERVICAL REGION WITHOUT MYELOPATHY OR RADICULOPATHY: Primary | ICD-10-CM

## 2019-09-19 PROCEDURE — 99999 PR PBB SHADOW E&M-EST. PATIENT-LVL III: CPT | Mod: PBBFAC,,, | Performed by: PAIN MEDICINE

## 2019-09-19 PROCEDURE — 1101F PT FALLS ASSESS-DOCD LE1/YR: CPT | Mod: CPTII,S$GLB,, | Performed by: PAIN MEDICINE

## 2019-09-19 PROCEDURE — 3077F PR MOST RECENT SYSTOLIC BLOOD PRESSURE >= 140 MM HG: ICD-10-PCS | Mod: CPTII,S$GLB,, | Performed by: PAIN MEDICINE

## 2019-09-19 PROCEDURE — 1101F PR PT FALLS ASSESS DOC 0-1 FALLS W/OUT INJ PAST YR: ICD-10-PCS | Mod: CPTII,S$GLB,, | Performed by: PAIN MEDICINE

## 2019-09-19 PROCEDURE — 99203 OFFICE O/P NEW LOW 30 MIN: CPT | Mod: S$GLB,,, | Performed by: PAIN MEDICINE

## 2019-09-19 PROCEDURE — 3079F PR MOST RECENT DIASTOLIC BLOOD PRESSURE 80-89 MM HG: ICD-10-PCS | Mod: CPTII,S$GLB,, | Performed by: PAIN MEDICINE

## 2019-09-19 PROCEDURE — 99203 PR OFFICE/OUTPT VISIT, NEW, LEVL III, 30-44 MIN: ICD-10-PCS | Mod: S$GLB,,, | Performed by: PAIN MEDICINE

## 2019-09-19 PROCEDURE — 3079F DIAST BP 80-89 MM HG: CPT | Mod: CPTII,S$GLB,, | Performed by: PAIN MEDICINE

## 2019-09-19 PROCEDURE — 3077F SYST BP >= 140 MM HG: CPT | Mod: CPTII,S$GLB,, | Performed by: PAIN MEDICINE

## 2019-09-19 PROCEDURE — 99999 PR PBB SHADOW E&M-EST. PATIENT-LVL III: ICD-10-PCS | Mod: PBBFAC,,, | Performed by: PAIN MEDICINE

## 2019-09-19 NOTE — PROGRESS NOTES
Chief Pain Complaint:  Neck Pain    This note was created using voice recognition, there may be topographical errors that were missed during proofreading.    History of Present Illness:   This patient is a 79 y.o. female who presents today complaining of the above noted pain/s. The patient describes the pain as follows.  Ms. Mccoy is the patient complaints of cervical spine pain which started approximately 3 months ago with no inciting event.  Currently she rates her pain as a 7/10 in aching sensation which is worse with activity and improved with Tylenol.  She does have headaches from time to time which are well controlled Tylenol and ibuprofen.  She denies having any radiating symptoms in her upper extremities and denies having numbness and weakness.  She has not participated in physical therapy, denies having surgery on cervical spine, denies having any cervical epidural steroid injections in the past.  She denies having bowel bladder difficulty.    Previous Therapy:  Medications:  Tylenol, ibuprofen  Injections: None  Surgeries: None  Physical Therapy: None    Past Surgical History:   Procedure Laterality Date    CATARACT EXTRACTION W/  INTRAOCULAR LENS IMPLANT  OU    COLONOSCOPY N/A 11/18/2013    Performed by Morro Porter MD at [facility]     Imaging / Labs / Studies (reviewed on 9/19/2019):  Results for orders placed during the hospital encounter of 08/26/19   X-Ray Cervical Spine AP And Lateral    Narrative EXAMINATION:  XR CERVICAL SPINE AP LATERAL    CLINICAL HISTORY:  Cervicalgia    TECHNIQUE:  AP, lateral and open mouth views of the cervical spine were performed.    COMPARISON:  None.    FINDINGS:  There is visualization T1-2 level on the swimmer's view.    Vertebral body height well maintained.  Disc spaces appear adequate at all levels.  There is multilevel anterior marginal spurring most prominently at the C4-5 and C5-6 levels.  Mild anterior subluxation C6 on C7 noted on both the lateral and  "swimmer's lateral views.    Prevertebral soft tissues within normal limits.  C1-2 articulation and odontoid normal in appearance allowing for positioning.  Pre dens space normal.  Incidental note made of calcification the area of the left carotid vessels.      Impression Cervical spondylosis without fracture noted.    Minimal anterior subluxation C6 on C7.    Additional findings as above.     Review of Systems:  Review of Systems   Constitutional: Negative for fever.   Eyes: Negative for blurred vision.   Respiratory: Negative for cough and wheezing.    Cardiovascular: Negative for chest pain and orthopnea.   Gastrointestinal: Negative for constipation, diarrhea, nausea and vomiting.   Genitourinary: Negative for dysuria.   Musculoskeletal: Positive for neck pain.   Skin: Negative for itching and rash.   Neurological: Negative for weakness.   Endo/Heme/Allergies: Does not bruise/bleed easily.       Physical Exam:  BP (!) 155/80 (BP Location: Right arm, Patient Position: Sitting, BP Method: Medium (Automatic))   Pulse 75   Ht 5' 7" (1.702 m)   Wt 99.8 kg (220 lb)   BMI 34.46 kg/m²  (reviewed on 9/19/2019)\  General    Constitutional: She is oriented to person, place, and time. She appears well-developed and well-nourished.   HENT:   Head: Normocephalic and atraumatic.   Eyes: EOM are normal.   Neck: Neck supple.   Pulmonary/Chest: Effort normal.   Abdominal: She exhibits no distension.   Neurological: She is alert and oriented to person, place, and time. No cranial nerve deficit.   Psychiatric: She has a normal mood and affect.     General Musculoskeletal Exam   Gait: normal     Back (L-Spine & T-Spine) / Neck (C-Spine) Exam     Tenderness Right paramedian tenderness of the Lower C-Spine. Left paramedian tenderness of the Lower C-Spine.     Neck (C-Spine) Range of Motion   Flexion:     Normal  Extension: Normal  Right Lateral Bend: normal  Left Lateral Bend: normal  Right Rotation: normal  Left Rotation: " normal    Spinal Sensation   Right Side Sensation  C-Spine Level: normal   Left Side Sensation  C-Spine Level: normal    Neck (C-Spine) Tests   Spurling's Test   Left:  Negative  Right: negative    Comments:  No increase in pain with cervical flexion extension left right lateral bending; minimal tenderness to palpation over cervical facets      Muscle Strength   Right Upper Extremity   Biceps: 5/5/5   Deltoid:  5/5  Triceps:  5/5  Left Upper Extremity  Biceps: 5/5/5   Deltoid:  5/5  Triceps:  5/5    Reflexes     Left Side  Biceps:  2+  Triceps:  2+  Brachioradialis:  2+    Right Side   Biceps:  2+  Triceps:  2+  Brachioradialis:  2+      Assessment  Cervical Spondylosis  Headache    1. 79 y.o. year old patient with PMH of   Past Medical History:   Diagnosis Date    Anxiety     followed by Dr. Mabry    Bronchitis     Carotid stenosis     Depression     followed by Dr. Mabry    Diabetes mellitus type II     Hyperlipidemia     Hyperparathyroidism, unspecified     Has appointment to establish with Dr. Gutierrez    Hypertension     Leukopenia     Vertigo     followed by ENT    Vitamin B12 deficiency     Vitamin D deficiency       presenting with pain located cervical spine  2. Pain Generators / Etiology: Cervical Spondylosis  3. Failed Meds (E- Effective, NE- Not Effective):  Tylenol-effective, ibuprofen-effective  4. Physical Therapy - None  5. Psychological comorbidities - Depression and Anxiety  6. Anticoagulants / Antiplatelets: Aspirin 81mg     PLAN:  1. Medications:  Continue Tylenol and ibuprofen over-the-counter as needed    2. PT - none; can provide a referral for physical therapy in the future if symptoms worsen  3. Psychological - continue all medications as prescribed  4. Labs - obtain  none  5. Imaging - obtain  none  6. Interventions - schedule none  7. Referrals - none  8. Records - none  9. Follow up visit - follow up in clinic as needed  10. Patient Questions - answered all of the patient's  questions regarding diagnosis, therapy, and treatment  11.  This condition does not require this patient to take time off of work    PRESTON Mejias MD  Interventional Pain  [facility] [place]

## 2019-09-19 NOTE — LETTER
September 19, 2019      Shobha Swift MD  [address] [place] [address] [address] [place] [address]           [facility]  [address] [place] [address] [place]  [place] [address]  Phone: [phone]  Fax: [phone]          Patient: Grazyna Mccoy   MR Number: [id number]   YOB: 1940   Date of Visit: 9/19/2019       Dear Dr. Shobha Swift:    Thank you for referring Grazyna Mccoy to me for evaluation. Attached you will find relevant portions of my assessment and plan of care.    If you have questions, please do not hesitate to call me. I look forward to following Grazyna Mccoy along with you.    Sincerely,    Dayday Mejias MD    Enclosure  CC:  No Recipients    If you would like to receive this communication electronically, please contact [email or web address] or [phone] to request more information on [employer] Link access.    For providers and/or their staff who would like to refer a patient to [facility], please contact us through our one-stop-shop provider referral line, [facility], at [phone].    If you feel you have received this communication in error or would no longer like to receive these types of communications, please e-mail [email or web address]

## 2019-09-19 NOTE — PATIENT INSTRUCTIONS
-continue Tylenol and ibuprofen over-the-counter as needed  -can provide a referral for physical therapy if needed in the future  -follow up in clinic as needed

## 2020-01-14 DIAGNOSIS — I15.2 HYPERTENSION ASSOCIATED WITH DIABETES: ICD-10-CM

## 2020-01-14 DIAGNOSIS — E11.59 HYPERTENSION ASSOCIATED WITH DIABETES: ICD-10-CM

## 2020-01-28 DIAGNOSIS — E11.59 HYPERTENSION ASSOCIATED WITH DIABETES: ICD-10-CM

## 2020-01-28 DIAGNOSIS — E11.69 HYPERLIPIDEMIA ASSOCIATED WITH TYPE 2 DIABETES MELLITUS: ICD-10-CM

## 2020-01-28 DIAGNOSIS — E78.5 HYPERLIPIDEMIA ASSOCIATED WITH TYPE 2 DIABETES MELLITUS: ICD-10-CM

## 2020-01-28 DIAGNOSIS — I15.2 HYPERTENSION ASSOCIATED WITH DIABETES: ICD-10-CM

## 2020-01-28 RX ORDER — EZETIMIBE 10 MG/1
TABLET ORAL
Qty: 90 TABLET | Refills: 1 | Status: SHIPPED | OUTPATIENT
Start: 2020-01-28 | End: 2020-07-29

## 2020-01-28 RX ORDER — LOSARTAN POTASSIUM 50 MG/1
TABLET ORAL
Qty: 90 TABLET | Refills: 1 | Status: SHIPPED | OUTPATIENT
Start: 2020-01-28 | End: 2020-02-26 | Stop reason: DRUGHIGH

## 2020-02-07 ENCOUNTER — PATIENT MESSAGE (OUTPATIENT)
Dept: INTERNAL MEDICINE | Facility: CLINIC | Age: 80
End: 2020-02-07

## 2020-02-18 ENCOUNTER — OFFICE VISIT (OUTPATIENT)
Dept: INTERNAL MEDICINE | Facility: CLINIC | Age: 80
End: 2020-02-18
Payer: MEDICARE

## 2020-02-18 VITALS
BODY MASS INDEX: 35.12 KG/M2 | TEMPERATURE: 98 F | HEIGHT: 67 IN | HEART RATE: 65 BPM | WEIGHT: 223.75 LBS | DIASTOLIC BLOOD PRESSURE: 78 MMHG | SYSTOLIC BLOOD PRESSURE: 154 MMHG | OXYGEN SATURATION: 100 %

## 2020-02-18 DIAGNOSIS — E78.5 HYPERLIPIDEMIA ASSOCIATED WITH TYPE 2 DIABETES MELLITUS: ICD-10-CM

## 2020-02-18 DIAGNOSIS — F32.A DEPRESSION, UNSPECIFIED DEPRESSION TYPE: ICD-10-CM

## 2020-02-18 DIAGNOSIS — R05.3 CHRONIC COUGH: Chronic | ICD-10-CM

## 2020-02-18 DIAGNOSIS — E11.69 HYPERLIPIDEMIA ASSOCIATED WITH TYPE 2 DIABETES MELLITUS: ICD-10-CM

## 2020-02-18 DIAGNOSIS — Z00.01 ENCOUNTER FOR ROUTINE ADULT HEALTH EXAMINATION WITH ABNORMAL FINDINGS: Primary | ICD-10-CM

## 2020-02-18 DIAGNOSIS — E55.9 VITAMIN D DEFICIENCY: ICD-10-CM

## 2020-02-18 DIAGNOSIS — H81.01: ICD-10-CM

## 2020-02-18 DIAGNOSIS — K21.9 GASTROESOPHAGEAL REFLUX DISEASE, ESOPHAGITIS PRESENCE NOT SPECIFIED: ICD-10-CM

## 2020-02-18 DIAGNOSIS — J47.9 BRONCHIECTASIS WITHOUT COMPLICATION: Chronic | ICD-10-CM

## 2020-02-18 DIAGNOSIS — E11.59 HYPERTENSION ASSOCIATED WITH DIABETES: ICD-10-CM

## 2020-02-18 DIAGNOSIS — I15.2 HYPERTENSION ASSOCIATED WITH DIABETES: ICD-10-CM

## 2020-02-18 DIAGNOSIS — M54.50 ACUTE BILATERAL LOW BACK PAIN WITHOUT SCIATICA: ICD-10-CM

## 2020-02-18 DIAGNOSIS — H60.542 ECZEMA OF LEFT EXTERNAL EAR: ICD-10-CM

## 2020-02-18 DIAGNOSIS — F41.9 ANXIETY: ICD-10-CM

## 2020-02-18 LAB
BACTERIA #/AREA URNS HPF: ABNORMAL /HPF
BILIRUB UR QL STRIP: NEGATIVE
CLARITY UR: CLEAR
COLOR UR: YELLOW
GLUCOSE UR QL STRIP: NEGATIVE
HGB UR QL STRIP: ABNORMAL
KETONES UR QL STRIP: NEGATIVE
LEUKOCYTE ESTERASE UR QL STRIP: ABNORMAL
MICROSCOPIC COMMENT: ABNORMAL
NITRITE UR QL STRIP: NEGATIVE
NON-SQ EPI CELLS #/AREA URNS HPF: 2 /HPF
PH UR STRIP: 6 [PH] (ref 5–8)
PROT UR QL STRIP: NEGATIVE
RBC #/AREA URNS HPF: 2 /HPF (ref 0–4)
SP GR UR STRIP: 1.01 (ref 1–1.03)
SQUAMOUS #/AREA URNS HPF: 3 /HPF
URN SPEC COLLECT METH UR: ABNORMAL
WBC #/AREA URNS HPF: 6 /HPF (ref 0–5)

## 2020-02-18 PROCEDURE — 1125F PR PAIN SEVERITY QUANTIFIED, PAIN PRESENT: ICD-10-PCS | Mod: S$GLB,,, | Performed by: NURSE PRACTITIONER

## 2020-02-18 PROCEDURE — 3078F PR MOST RECENT DIASTOLIC BLOOD PRESSURE < 80 MM HG: ICD-10-PCS | Mod: CPTII,S$GLB,, | Performed by: NURSE PRACTITIONER

## 2020-02-18 PROCEDURE — 82043 UR ALBUMIN QUANTITATIVE: CPT

## 2020-02-18 PROCEDURE — 1159F MED LIST DOCD IN RCRD: CPT | Mod: S$GLB,,, | Performed by: NURSE PRACTITIONER

## 2020-02-18 PROCEDURE — 1101F PT FALLS ASSESS-DOCD LE1/YR: CPT | Mod: CPTII,S$GLB,, | Performed by: NURSE PRACTITIONER

## 2020-02-18 PROCEDURE — 99999 PR PBB SHADOW E&M-EST. PATIENT-LVL V: ICD-10-PCS | Mod: PBBFAC,,, | Performed by: NURSE PRACTITIONER

## 2020-02-18 PROCEDURE — 3077F PR MOST RECENT SYSTOLIC BLOOD PRESSURE >= 140 MM HG: ICD-10-PCS | Mod: CPTII,S$GLB,, | Performed by: NURSE PRACTITIONER

## 2020-02-18 PROCEDURE — 3051F HG A1C>EQUAL 7.0%<8.0%: CPT | Mod: CPTII,S$GLB,, | Performed by: NURSE PRACTITIONER

## 2020-02-18 PROCEDURE — 99215 PR OFFICE/OUTPT VISIT, EST, LEVL V, 40-54 MIN: ICD-10-PCS | Mod: S$GLB,,, | Performed by: NURSE PRACTITIONER

## 2020-02-18 PROCEDURE — 3077F SYST BP >= 140 MM HG: CPT | Mod: CPTII,S$GLB,, | Performed by: NURSE PRACTITIONER

## 2020-02-18 PROCEDURE — 81000 URINALYSIS NONAUTO W/SCOPE: CPT

## 2020-02-18 PROCEDURE — 99999 PR PBB SHADOW E&M-EST. PATIENT-LVL V: CPT | Mod: PBBFAC,,, | Performed by: NURSE PRACTITIONER

## 2020-02-18 PROCEDURE — 1101F PR PT FALLS ASSESS DOC 0-1 FALLS W/OUT INJ PAST YR: ICD-10-PCS | Mod: CPTII,S$GLB,, | Performed by: NURSE PRACTITIONER

## 2020-02-18 PROCEDURE — 1125F AMNT PAIN NOTED PAIN PRSNT: CPT | Mod: S$GLB,,, | Performed by: NURSE PRACTITIONER

## 2020-02-18 PROCEDURE — 3078F DIAST BP <80 MM HG: CPT | Mod: CPTII,S$GLB,, | Performed by: NURSE PRACTITIONER

## 2020-02-18 PROCEDURE — 99215 OFFICE O/P EST HI 40 MIN: CPT | Mod: S$GLB,,, | Performed by: NURSE PRACTITIONER

## 2020-02-18 PROCEDURE — 1159F PR MEDICATION LIST DOCUMENTED IN MEDICAL RECORD: ICD-10-PCS | Mod: S$GLB,,, | Performed by: NURSE PRACTITIONER

## 2020-02-18 PROCEDURE — 3051F PR MOST RECENT HEMOGLOBIN A1C LEVEL 7.0 - < 8.0%: ICD-10-PCS | Mod: CPTII,S$GLB,, | Performed by: NURSE PRACTITIONER

## 2020-02-18 RX ORDER — ACETAMINOPHEN 325 MG/1
650 TABLET ORAL EVERY 6 HOURS PRN
COMMUNITY

## 2020-02-18 RX ORDER — FAMOTIDINE 20 MG/1
20 TABLET, FILM COATED ORAL 2 TIMES DAILY
Qty: 60 TABLET | Refills: 11 | Status: SHIPPED | OUTPATIENT
Start: 2020-02-18 | End: 2020-09-25 | Stop reason: SDUPTHER

## 2020-02-18 RX ORDER — FLUOCINONIDE TOPICAL SOLUTION USP, 0.05% 0.5 MG/ML
SOLUTION TOPICAL 2 TIMES DAILY
Qty: 20 ML | Refills: 0 | Status: SHIPPED | OUTPATIENT
Start: 2020-02-18 | End: 2020-02-25 | Stop reason: SDUPTHER

## 2020-02-18 NOTE — PATIENT INSTRUCTIONS
Ask your pharmacy about the Shingles vaccine     Check your BP daily and keep a log   Bring it to your next appointment for provider to review

## 2020-02-19 LAB
ALBUMIN/CREAT UR: 14 UG/MG (ref 0–30)
CREAT UR-MCNC: 50 MG/DL (ref 15–325)
MICROALBUMIN UR DL<=1MG/L-MCNC: 7 UG/ML

## 2020-02-20 ENCOUNTER — TELEPHONE (OUTPATIENT)
Dept: INTERNAL MEDICINE | Facility: CLINIC | Age: 80
End: 2020-02-20

## 2020-02-20 DIAGNOSIS — H60.542 ECZEMA OF LEFT EXTERNAL EAR: Primary | ICD-10-CM

## 2020-02-20 RX ORDER — FLUOCINOLONE ACETONIDE 0.11 MG/ML
5 OIL AURICULAR (OTIC) 2 TIMES DAILY
Qty: 20 ML | Refills: 0 | Status: SHIPPED | OUTPATIENT
Start: 2020-02-20 | End: 2020-02-26

## 2020-02-20 NOTE — TELEPHONE ENCOUNTER
This medication is not available fluocinonide (LIDEX) 0.05 % external solution pharmacy would like to know if there is something else it can be change to..

## 2020-02-21 ENCOUNTER — TELEPHONE (OUTPATIENT)
Dept: INTERNAL MEDICINE | Facility: CLINIC | Age: 80
End: 2020-02-21

## 2020-02-25 DIAGNOSIS — H60.542 ECZEMA OF LEFT EXTERNAL EAR: ICD-10-CM

## 2020-02-25 RX ORDER — FLUOCINONIDE TOPICAL SOLUTION USP, 0.05% 0.5 MG/ML
SOLUTION TOPICAL 2 TIMES DAILY
Qty: 60 ML | Refills: 0 | Status: SHIPPED | OUTPATIENT
Start: 2020-02-25 | End: 2020-08-18

## 2020-02-25 NOTE — TELEPHONE ENCOUNTER
Pt notified rx sent here.   appt scheduled for 2/26/20, pt states she is having neck pain, request appt to be moved up from 3/3/20

## 2020-02-26 ENCOUNTER — OFFICE VISIT (OUTPATIENT)
Dept: INTERNAL MEDICINE | Facility: CLINIC | Age: 80
End: 2020-02-26
Payer: MEDICARE

## 2020-02-26 ENCOUNTER — LAB VISIT (OUTPATIENT)
Dept: LAB | Facility: HOSPITAL | Age: 80
End: 2020-02-26
Payer: MEDICARE

## 2020-02-26 VITALS
HEART RATE: 86 BPM | OXYGEN SATURATION: 95 % | DIASTOLIC BLOOD PRESSURE: 80 MMHG | RESPIRATION RATE: 16 BRPM | WEIGHT: 224.44 LBS | TEMPERATURE: 98 F | SYSTOLIC BLOOD PRESSURE: 150 MMHG | HEIGHT: 67 IN | BODY MASS INDEX: 35.22 KG/M2

## 2020-02-26 DIAGNOSIS — E78.5 HYPERLIPIDEMIA ASSOCIATED WITH TYPE 2 DIABETES MELLITUS: ICD-10-CM

## 2020-02-26 DIAGNOSIS — E11.59 HYPERTENSION ASSOCIATED WITH DIABETES: ICD-10-CM

## 2020-02-26 DIAGNOSIS — E11.69 HYPERLIPIDEMIA ASSOCIATED WITH TYPE 2 DIABETES MELLITUS: ICD-10-CM

## 2020-02-26 DIAGNOSIS — M47.812 SPONDYLOSIS, CERVICAL: Primary | ICD-10-CM

## 2020-02-26 DIAGNOSIS — H60.542 ECZEMA OF LEFT EXTERNAL EAR: ICD-10-CM

## 2020-02-26 DIAGNOSIS — E55.9 VITAMIN D DEFICIENCY: ICD-10-CM

## 2020-02-26 DIAGNOSIS — I15.2 HYPERTENSION ASSOCIATED WITH DIABETES: ICD-10-CM

## 2020-02-26 DIAGNOSIS — E11.9 DIABETES MELLITUS TYPE 2, DIET-CONTROLLED: ICD-10-CM

## 2020-02-26 DIAGNOSIS — J30.9 ALLERGIC RHINITIS, UNSPECIFIED SEASONALITY, UNSPECIFIED TRIGGER: ICD-10-CM

## 2020-02-26 LAB
25(OH)D3+25(OH)D2 SERPL-MCNC: 27 NG/ML (ref 30–96)
ALBUMIN SERPL BCP-MCNC: 3.6 G/DL (ref 3.5–5.2)
ALP SERPL-CCNC: 79 U/L (ref 55–135)
ALT SERPL W/O P-5'-P-CCNC: 11 U/L (ref 10–44)
ANION GAP SERPL CALC-SCNC: 7 MMOL/L (ref 8–16)
AST SERPL-CCNC: 19 U/L (ref 10–40)
BILIRUB SERPL-MCNC: 0.4 MG/DL (ref 0.1–1)
BUN SERPL-MCNC: 11 MG/DL (ref 8–23)
CALCIUM SERPL-MCNC: 8.7 MG/DL (ref 8.7–10.5)
CHLORIDE SERPL-SCNC: 104 MMOL/L (ref 95–110)
CHOLEST SERPL-MCNC: 151 MG/DL (ref 120–199)
CHOLEST/HDLC SERPL: 3.1 {RATIO} (ref 2–5)
CO2 SERPL-SCNC: 26 MMOL/L (ref 23–29)
CREAT SERPL-MCNC: 0.8 MG/DL (ref 0.5–1.4)
EST. GFR  (AFRICAN AMERICAN): >60 ML/MIN/1.73 M^2
EST. GFR  (NON AFRICAN AMERICAN): >60 ML/MIN/1.73 M^2
ESTIMATED AVG GLUCOSE: 180 MG/DL (ref 68–131)
GLUCOSE SERPL-MCNC: 155 MG/DL (ref 70–110)
HBA1C MFR BLD HPLC: 7.9 % (ref 4–5.6)
HDLC SERPL-MCNC: 49 MG/DL (ref 40–75)
HDLC SERPL: 32.5 % (ref 20–50)
LDLC SERPL CALC-MCNC: 82.2 MG/DL (ref 63–159)
NONHDLC SERPL-MCNC: 102 MG/DL
POTASSIUM SERPL-SCNC: 4.2 MMOL/L (ref 3.5–5.1)
PROT SERPL-MCNC: 7.3 G/DL (ref 6–8.4)
SODIUM SERPL-SCNC: 137 MMOL/L (ref 136–145)
TRIGL SERPL-MCNC: 99 MG/DL (ref 30–150)

## 2020-02-26 PROCEDURE — 1101F PR PT FALLS ASSESS DOC 0-1 FALLS W/OUT INJ PAST YR: ICD-10-PCS | Mod: CPTII,S$GLB,, | Performed by: NURSE PRACTITIONER

## 2020-02-26 PROCEDURE — 1159F MED LIST DOCD IN RCRD: CPT | Mod: S$GLB,,, | Performed by: NURSE PRACTITIONER

## 2020-02-26 PROCEDURE — 80061 LIPID PANEL: CPT

## 2020-02-26 PROCEDURE — 1125F AMNT PAIN NOTED PAIN PRSNT: CPT | Mod: S$GLB,,, | Performed by: NURSE PRACTITIONER

## 2020-02-26 PROCEDURE — 3077F PR MOST RECENT SYSTOLIC BLOOD PRESSURE >= 140 MM HG: ICD-10-PCS | Mod: CPTII,S$GLB,, | Performed by: NURSE PRACTITIONER

## 2020-02-26 PROCEDURE — 80053 COMPREHEN METABOLIC PANEL: CPT

## 2020-02-26 PROCEDURE — 1101F PT FALLS ASSESS-DOCD LE1/YR: CPT | Mod: CPTII,S$GLB,, | Performed by: NURSE PRACTITIONER

## 2020-02-26 PROCEDURE — 83036 HEMOGLOBIN GLYCOSYLATED A1C: CPT

## 2020-02-26 PROCEDURE — 99999 PR PBB SHADOW E&M-EST. PATIENT-LVL V: ICD-10-PCS | Mod: PBBFAC,,, | Performed by: NURSE PRACTITIONER

## 2020-02-26 PROCEDURE — 1125F PR PAIN SEVERITY QUANTIFIED, PAIN PRESENT: ICD-10-PCS | Mod: S$GLB,,, | Performed by: NURSE PRACTITIONER

## 2020-02-26 PROCEDURE — 1159F PR MEDICATION LIST DOCUMENTED IN MEDICAL RECORD: ICD-10-PCS | Mod: S$GLB,,, | Performed by: NURSE PRACTITIONER

## 2020-02-26 PROCEDURE — 3051F HG A1C>EQUAL 7.0%<8.0%: CPT | Mod: CPTII,S$GLB,, | Performed by: NURSE PRACTITIONER

## 2020-02-26 PROCEDURE — 99214 PR OFFICE/OUTPT VISIT, EST, LEVL IV, 30-39 MIN: ICD-10-PCS | Mod: S$GLB,,, | Performed by: NURSE PRACTITIONER

## 2020-02-26 PROCEDURE — 99999 PR PBB SHADOW E&M-EST. PATIENT-LVL V: CPT | Mod: PBBFAC,,, | Performed by: NURSE PRACTITIONER

## 2020-02-26 PROCEDURE — 82306 VITAMIN D 25 HYDROXY: CPT

## 2020-02-26 PROCEDURE — 3079F PR MOST RECENT DIASTOLIC BLOOD PRESSURE 80-89 MM HG: ICD-10-PCS | Mod: CPTII,S$GLB,, | Performed by: NURSE PRACTITIONER

## 2020-02-26 PROCEDURE — 99214 OFFICE O/P EST MOD 30 MIN: CPT | Mod: S$GLB,,, | Performed by: NURSE PRACTITIONER

## 2020-02-26 PROCEDURE — 3051F PR MOST RECENT HEMOGLOBIN A1C LEVEL 7.0 - < 8.0%: ICD-10-PCS | Mod: CPTII,S$GLB,, | Performed by: NURSE PRACTITIONER

## 2020-02-26 PROCEDURE — 36415 COLL VENOUS BLD VENIPUNCTURE: CPT

## 2020-02-26 PROCEDURE — 3079F DIAST BP 80-89 MM HG: CPT | Mod: CPTII,S$GLB,, | Performed by: NURSE PRACTITIONER

## 2020-02-26 PROCEDURE — 3077F SYST BP >= 140 MM HG: CPT | Mod: CPTII,S$GLB,, | Performed by: NURSE PRACTITIONER

## 2020-02-26 RX ORDER — LOSARTAN POTASSIUM 100 MG/1
100 TABLET ORAL DAILY
Qty: 90 TABLET | Refills: 3 | Status: SHIPPED | OUTPATIENT
Start: 2020-02-26 | End: 2020-06-03

## 2020-02-26 RX ORDER — FLUTICASONE PROPIONATE 50 MCG
1 SPRAY, SUSPENSION (ML) NASAL DAILY
Qty: 16 G | Refills: 4 | Status: SHIPPED | OUTPATIENT
Start: 2020-02-26

## 2020-02-26 NOTE — PROGRESS NOTES
Grazyna Mccoy 79 y.o. female     Chief Complaint:  Chief Complaint   Patient presents with    Blood Pressure Check    Headache       History of Present Illness:  Pt present for 2 week f/u BP   LOV 2/18 154/78, not regularly checking BPs  Today 142/62   Home Bps 160-180/70s   Chronic cough - pepcid has helped, but not resolved   left ear still itching - just picked up lidex today due to pharmacy/insurance issues   Left nasal congestion     Head/Neck pain and stiffness   Chronic, worsening   Taking tylenol   No accidents/lifting   Hx cervical spondylosis     Exam:  Review of Systems   Constitutional: Negative for activity change, appetite change, chills, diaphoresis, fatigue, fever and unexpected weight change.   HENT: Positive for congestion, ear pain, postnasal drip and sinus pressure (L>R). Negative for dental problem, drooling, ear discharge, facial swelling (left ear ithcing ), hearing loss, mouth sores, nosebleeds, rhinorrhea, sinus pain, sneezing, sore throat and trouble swallowing.    Eyes: Negative for visual disturbance.   Respiratory: Negative for shortness of breath.    Cardiovascular: Negative for palpitations.   Gastrointestinal: Negative for abdominal distention, diarrhea, nausea and vomiting.   Genitourinary: Negative for difficulty urinating.   Musculoskeletal: Positive for neck pain and neck stiffness.   Allergic/Immunologic: Positive for environmental allergies.   Psychiatric/Behavioral: The patient is nervous/anxious.      Physical Exam   Constitutional: She is oriented to person, place, and time. She appears well-developed and well-nourished. She is cooperative.  Non-toxic appearance. She does not have a sickly appearance. She does not appear ill. No distress.   HENT:   Head: Normocephalic and atraumatic.   Right Ear: Hearing, tympanic membrane, external ear and ear canal normal.   Left Ear: Hearing, tympanic membrane and external ear normal.   Nose: Mucosal edema present.   Mouth/Throat: Uvula  is midline and oropharynx is clear and moist. No oropharyngeal exudate.   PND  L external ear eczema    Eyes: Pupils are equal, round, and reactive to light. Conjunctivae and EOM are normal. Right eye exhibits no discharge. Left eye exhibits no discharge. No scleral icterus.   Neck: Normal range of motion. No tracheal deviation present.   Cardiovascular: Normal rate and regular rhythm.   Pulmonary/Chest: Effort normal and breath sounds normal. No stridor. No respiratory distress. She has no wheezes. She has no rales. She exhibits no tenderness.   Abdominal: Soft. Bowel sounds are normal. She exhibits no distension. There is no tenderness.   Musculoskeletal: Normal range of motion. She exhibits no edema.   Neurological: She is alert and oriented to person, place, and time.   Skin: Skin is warm, dry and intact. No rash noted. She is not diaphoretic. No erythema. No pallor.   Psychiatric: Her behavior is normal. Judgment and thought content normal. Her mood appears anxious. Her speech is tangential. Cognition and memory are normal.   Nursing note and vitals reviewed.      Most Recent Laboratory Results Reviewed ({Yes)  Lab Results   Component Value Date    WBC 7.69 08/19/2019    HGB 12.5 08/19/2019    HCT 37.9 08/19/2019     08/19/2019    CHOL 151 02/26/2020    TRIG 99 02/26/2020    HDL 49 02/26/2020    ALT 11 02/26/2020    AST 19 02/26/2020     02/26/2020    K 4.2 02/26/2020     02/26/2020    CREATININE 0.8 02/26/2020    BUN 11 02/26/2020    CO2 26 02/26/2020    TSH 1.938 08/19/2019    GLUF 136 (H) 10/13/2004    HGBA1C 7.9 (H) 02/26/2020       Assessment     ICD-10-CM ICD-9-CM   1. Spondylosis, cervical M47.812 721.0   2. Hypertension associated with diabetes E11.59 250.80    I10 401.9   3. Allergic rhinitis, unspecified seasonality, unspecified trigger J30.9 477.9   4. Eczema of left external ear H60.542 380.22        Plan   Spondylosis, cervical  -     Ambulatory referral/consult to  Physical/Occupational Therapy; Future; Expected date: 03/04/2020    Hypertension associated with diabetes  Check BP daily and keep a log   Bring log and BP cuff to next apt    Increase losartan from 50 to 100mg   -     losartan (COZAAR) 100 MG tablet; Take 1 tablet (100 mg total) by mouth once daily.  Dispense: 90 tablet; Refill: 3    Allergic rhinitis, unspecified seasonality, unspecified trigger  -     fluticasone propionate (FLONASE) 50 mcg/actuation nasal spray; 1 spray (50 mcg total) by Each Nostril route once daily.  Dispense: 16 g; Refill: 4  - look into air purifier for bedroom     Eczema of left external ear  Start lidex as prescribed at LOV     Follow up in about 2 weeks (around 3/11/2020), or if symptoms worsen or fail to improve, for BP recheck.  Future Appointments     Date Provider Specialty Appt Notes    3/11/2020 Ayah Palomino NP Internal Medicine 2 week follow up blood pressure    7/24/2020 MARCELO Marx, BEVERLEY Ophthalmology 1yr/RTC    8/18/2020 Shobha Swift MD Internal Medicine annual

## 2020-02-26 NOTE — PATIENT INSTRUCTIONS
Look into air purifier and humidifier for your bedroom     Increase your losartan to 100mg daily   Check your BP daily and keep a log   Bring it to your next appointment for provider to review

## 2020-03-11 ENCOUNTER — OFFICE VISIT (OUTPATIENT)
Dept: INTERNAL MEDICINE | Facility: CLINIC | Age: 80
End: 2020-03-11
Payer: MEDICARE

## 2020-03-11 VITALS
OXYGEN SATURATION: 94 % | DIASTOLIC BLOOD PRESSURE: 62 MMHG | HEART RATE: 88 BPM | RESPIRATION RATE: 16 BRPM | HEIGHT: 67 IN | BODY MASS INDEX: 34.84 KG/M2 | TEMPERATURE: 97 F | SYSTOLIC BLOOD PRESSURE: 138 MMHG | WEIGHT: 222 LBS

## 2020-03-11 DIAGNOSIS — M54.2 ACUTE NECK PAIN: Primary | ICD-10-CM

## 2020-03-11 DIAGNOSIS — H60.542 ECZEMA OF LEFT EXTERNAL EAR: ICD-10-CM

## 2020-03-11 DIAGNOSIS — M47.812 SPONDYLOSIS, CERVICAL: ICD-10-CM

## 2020-03-11 DIAGNOSIS — I15.2 HYPERTENSION ASSOCIATED WITH DIABETES: ICD-10-CM

## 2020-03-11 DIAGNOSIS — F41.9 ANXIETY: ICD-10-CM

## 2020-03-11 DIAGNOSIS — E11.59 HYPERTENSION ASSOCIATED WITH DIABETES: ICD-10-CM

## 2020-03-11 PROCEDURE — 99999 PR PBB SHADOW E&M-EST. PATIENT-LVL V: CPT | Mod: PBBFAC,,, | Performed by: NURSE PRACTITIONER

## 2020-03-11 PROCEDURE — 1101F PR PT FALLS ASSESS DOC 0-1 FALLS W/OUT INJ PAST YR: ICD-10-PCS | Mod: CPTII,S$GLB,, | Performed by: NURSE PRACTITIONER

## 2020-03-11 PROCEDURE — 99214 PR OFFICE/OUTPT VISIT, EST, LEVL IV, 30-39 MIN: ICD-10-PCS | Mod: S$GLB,,, | Performed by: NURSE PRACTITIONER

## 2020-03-11 PROCEDURE — 1101F PT FALLS ASSESS-DOCD LE1/YR: CPT | Mod: CPTII,S$GLB,, | Performed by: NURSE PRACTITIONER

## 2020-03-11 PROCEDURE — 99214 OFFICE O/P EST MOD 30 MIN: CPT | Mod: S$GLB,,, | Performed by: NURSE PRACTITIONER

## 2020-03-11 PROCEDURE — 3078F DIAST BP <80 MM HG: CPT | Mod: CPTII,S$GLB,, | Performed by: NURSE PRACTITIONER

## 2020-03-11 PROCEDURE — 3075F SYST BP GE 130 - 139MM HG: CPT | Mod: CPTII,S$GLB,, | Performed by: NURSE PRACTITIONER

## 2020-03-11 PROCEDURE — 1159F MED LIST DOCD IN RCRD: CPT | Mod: S$GLB,,, | Performed by: NURSE PRACTITIONER

## 2020-03-11 PROCEDURE — 99999 PR PBB SHADOW E&M-EST. PATIENT-LVL V: ICD-10-PCS | Mod: PBBFAC,,, | Performed by: NURSE PRACTITIONER

## 2020-03-11 PROCEDURE — 3078F PR MOST RECENT DIASTOLIC BLOOD PRESSURE < 80 MM HG: ICD-10-PCS | Mod: CPTII,S$GLB,, | Performed by: NURSE PRACTITIONER

## 2020-03-11 PROCEDURE — 1159F PR MEDICATION LIST DOCUMENTED IN MEDICAL RECORD: ICD-10-PCS | Mod: S$GLB,,, | Performed by: NURSE PRACTITIONER

## 2020-03-11 PROCEDURE — 3051F PR MOST RECENT HEMOGLOBIN A1C LEVEL 7.0 - < 8.0%: ICD-10-PCS | Mod: CPTII,S$GLB,, | Performed by: NURSE PRACTITIONER

## 2020-03-11 PROCEDURE — 3075F PR MOST RECENT SYSTOLIC BLOOD PRESS GE 130-139MM HG: ICD-10-PCS | Mod: CPTII,S$GLB,, | Performed by: NURSE PRACTITIONER

## 2020-03-11 PROCEDURE — 3051F HG A1C>EQUAL 7.0%<8.0%: CPT | Mod: CPTII,S$GLB,, | Performed by: NURSE PRACTITIONER

## 2020-03-11 RX ORDER — TIZANIDINE 2 MG/1
2 TABLET ORAL EVERY 8 HOURS PRN
Qty: 30 TABLET | Refills: 0 | Status: SHIPPED | OUTPATIENT
Start: 2020-03-11 | End: 2020-03-21

## 2020-03-11 NOTE — PROGRESS NOTES
"Grazyna Mccoy 79 y.o. female     Chief Complaint:  Chief Complaint   Patient presents with    Follow-up    Neck Pain    Shoulder Pain     right side       History of Present Illness:  Pt presents for 2 week BP recheck   And f/u on neck/shoulder pain   LOV 2/26/20 and /80   Losartan increased to 100mg   Home logs 145/60s-70s  only did 100mg for a few days "made me too tired"     Neck/should pains worsening, canceled her PT eval   "too much pain to do PT"     L ear eczema has improved with lidex drops    Exam:  Review of Systems   Constitutional: Positive for fatigue (with increased losartan dose ). Negative for activity change, appetite change, chills, diaphoresis, fever and unexpected weight change.   HENT: Negative for congestion, dental problem, drooling, ear discharge, ear pain, facial swelling (left ear ithcing ), hearing loss, mouth sores, nosebleeds, postnasal drip, rhinorrhea, sinus pressure (L>R), sinus pain, sneezing, sore throat and trouble swallowing.    Eyes: Negative for visual disturbance.   Respiratory: Negative for shortness of breath.    Cardiovascular: Negative for palpitations.   Gastrointestinal: Negative for abdominal distention, diarrhea, nausea and vomiting.   Genitourinary: Negative for difficulty urinating.   Musculoskeletal: Positive for neck pain and neck stiffness.   Allergic/Immunologic: Positive for environmental allergies.   Psychiatric/Behavioral: The patient is nervous/anxious.      Physical Exam   Constitutional: She is oriented to person, place, and time. She appears well-developed and well-nourished. She is cooperative.  Non-toxic appearance. She does not have a sickly appearance. She does not appear ill. No distress.   HENT:   Head: Normocephalic and atraumatic.   Right Ear: Hearing, tympanic membrane, external ear and ear canal normal.   Left Ear: Hearing, tympanic membrane, external ear and ear canal normal.   Nose: Mucosal edema present.   Mouth/Throat: Uvula is " midline and oropharynx is clear and moist. No oropharyngeal exudate.   PND  L external ear eczema    Eyes: Pupils are equal, round, and reactive to light. Conjunctivae and EOM are normal. Right eye exhibits no discharge. Left eye exhibits no discharge. No scleral icterus.   Neck: Normal range of motion. No tracheal deviation present.   Cardiovascular: Normal rate and regular rhythm.   Pulmonary/Chest: Effort normal and breath sounds normal. No stridor. No respiratory distress. She has no wheezes. She has no rales. She exhibits no tenderness.   Abdominal: Soft. Bowel sounds are normal. She exhibits no distension. There is no tenderness.   Musculoskeletal: She exhibits tenderness (neck & shoulders ). She exhibits no edema.        Cervical back: She exhibits decreased range of motion and tenderness. She exhibits no swelling, no edema, no deformity and no laceration.   Neurological: She is alert and oriented to person, place, and time.   Skin: Skin is warm, dry and intact. No rash noted. She is not diaphoretic. No erythema. No pallor.   Psychiatric: Her behavior is normal. Judgment and thought content normal. Her mood appears anxious. Her speech is tangential. Cognition and memory are normal.   Nursing note and vitals reviewed.      Most Recent Laboratory Results Reviewed ({Yes)  Lab Results   Component Value Date    WBC 7.69 08/19/2019    HGB 12.5 08/19/2019    HCT 37.9 08/19/2019     08/19/2019    CHOL 151 02/26/2020    TRIG 99 02/26/2020    HDL 49 02/26/2020    ALT 11 02/26/2020    AST 19 02/26/2020     02/26/2020    K 4.2 02/26/2020     02/26/2020    CREATININE 0.8 02/26/2020    BUN 11 02/26/2020    CO2 26 02/26/2020    TSH 1.938 08/19/2019    GLUF 136 (H) 10/13/2004    HGBA1C 7.9 (H) 02/26/2020       Assessment     ICD-10-CM ICD-9-CM   1. Acute neck pain M54.2 723.1   2. Spondylosis, cervical M47.812 721.0   3. Hypertension associated with diabetes E11.59 250.80    I10 401.9   4. Anxiety F41.9  300.00   5. Eczema of left external ear H60.542 380.22        Plan   Acute neck pain  -     tiZANidine (ZANAFLEX) 2 MG tablet; Take 1 tablet (2 mg total) by mouth every 8 (eight) hours as needed.  Dispense: 30 tablet; Refill: 0  -     Ambulatory referral/consult to Physical/Occupational Therapy; Future; Expected date: 03/18/2020    Spondylosis, cervical  -     Ambulatory referral/consult to Physical/Occupational Therapy; Future; Expected date: 03/18/2020    Hypertension associated with diabetes  Change losartan to 50mg TuThSatSun, 100mg MWF   Check BP daily and keep a log   Bring log and BP cuff to next apt     Anxiety  Contributing to chronic conditions and likely current neck complaints     Eczema of left external ear  Improved       Follow up in about 1 month (around 4/11/2020), or if symptoms worsen or fail to improve, for BP recheck.  Future Appointments     Date Provider Specialty Appt Notes    3/12/2020  Radiology xray burns    3/23/2020  Radiology     4/13/2020 Ayah Palomino NP Internal Medicine 1 month f/u bp recheck     4/30/2020 Dayday Mejias MD Pain Medicine MRI REV    7/24/2020 MARCELO Marx, OD Ophthalmology 1yr/RTC    8/18/2020 Shobha Swift MD Internal Medicine annual

## 2020-03-11 NOTE — PATIENT INSTRUCTIONS
Take losartan 50mg TuTthSRandyn   Take losartan 100mg MonWedFri     Check your BP daily and keep a log   Bring it to your next appointment for provider to review

## 2020-03-12 ENCOUNTER — HOSPITAL ENCOUNTER (OUTPATIENT)
Dept: RADIOLOGY | Facility: HOSPITAL | Age: 80
Discharge: HOME | End: 2020-03-12
Attending: PAIN MEDICINE
Payer: MEDICARE

## 2020-03-12 ENCOUNTER — OFFICE VISIT (OUTPATIENT)
Dept: PAIN MEDICINE | Facility: CLINIC | Age: 80
End: 2020-03-12
Payer: MEDICARE

## 2020-03-12 VITALS
BODY MASS INDEX: 35.16 KG/M2 | SYSTOLIC BLOOD PRESSURE: 141 MMHG | DIASTOLIC BLOOD PRESSURE: 71 MMHG | HEIGHT: 67 IN | HEART RATE: 64 BPM | WEIGHT: 224 LBS

## 2020-03-12 DIAGNOSIS — M25.512 BILATERAL SHOULDER PAIN, UNSPECIFIED CHRONICITY: ICD-10-CM

## 2020-03-12 DIAGNOSIS — M47.812 SPONDYLOSIS OF CERVICAL REGION WITHOUT MYELOPATHY OR RADICULOPATHY: ICD-10-CM

## 2020-03-12 DIAGNOSIS — M25.511 BILATERAL SHOULDER PAIN, UNSPECIFIED CHRONICITY: ICD-10-CM

## 2020-03-12 DIAGNOSIS — M47.812 SPONDYLOSIS OF CERVICAL REGION WITHOUT MYELOPATHY OR RADICULOPATHY: Primary | ICD-10-CM

## 2020-03-12 PROCEDURE — 1101F PR PT FALLS ASSESS DOC 0-1 FALLS W/OUT INJ PAST YR: ICD-10-PCS | Mod: CPTII,S$GLB,, | Performed by: PAIN MEDICINE

## 2020-03-12 PROCEDURE — 3078F DIAST BP <80 MM HG: CPT | Mod: CPTII,S$GLB,, | Performed by: PAIN MEDICINE

## 2020-03-12 PROCEDURE — 1159F MED LIST DOCD IN RCRD: CPT | Mod: S$GLB,,, | Performed by: PAIN MEDICINE

## 2020-03-12 PROCEDURE — 3077F PR MOST RECENT SYSTOLIC BLOOD PRESSURE >= 140 MM HG: ICD-10-PCS | Mod: CPTII,S$GLB,, | Performed by: PAIN MEDICINE

## 2020-03-12 PROCEDURE — 1125F AMNT PAIN NOTED PAIN PRSNT: CPT | Mod: S$GLB,,, | Performed by: PAIN MEDICINE

## 2020-03-12 PROCEDURE — 99214 PR OFFICE/OUTPT VISIT, EST, LEVL IV, 30-39 MIN: ICD-10-PCS | Mod: S$GLB,,, | Performed by: PAIN MEDICINE

## 2020-03-12 PROCEDURE — 73030 X-RAY EXAM OF SHOULDER: CPT | Mod: 26,50,, | Performed by: RADIOLOGY

## 2020-03-12 PROCEDURE — 99999 PR PBB SHADOW E&M-EST. PATIENT-LVL IV: ICD-10-PCS | Mod: PBBFAC,,, | Performed by: PAIN MEDICINE

## 2020-03-12 PROCEDURE — 73030 X-RAY EXAM OF SHOULDER: CPT | Mod: TC,50

## 2020-03-12 PROCEDURE — 1159F PR MEDICATION LIST DOCUMENTED IN MEDICAL RECORD: ICD-10-PCS | Mod: S$GLB,,, | Performed by: PAIN MEDICINE

## 2020-03-12 PROCEDURE — 3078F PR MOST RECENT DIASTOLIC BLOOD PRESSURE < 80 MM HG: ICD-10-PCS | Mod: CPTII,S$GLB,, | Performed by: PAIN MEDICINE

## 2020-03-12 PROCEDURE — 1125F PR PAIN SEVERITY QUANTIFIED, PAIN PRESENT: ICD-10-PCS | Mod: S$GLB,,, | Performed by: PAIN MEDICINE

## 2020-03-12 PROCEDURE — 3077F SYST BP >= 140 MM HG: CPT | Mod: CPTII,S$GLB,, | Performed by: PAIN MEDICINE

## 2020-03-12 PROCEDURE — 73030 XR SHOULDER COMPLETE 2 OR MORE VIEWS BILATERAL: ICD-10-PCS | Mod: 26,50,, | Performed by: RADIOLOGY

## 2020-03-12 PROCEDURE — 99999 PR PBB SHADOW E&M-EST. PATIENT-LVL IV: CPT | Mod: PBBFAC,,, | Performed by: PAIN MEDICINE

## 2020-03-12 PROCEDURE — 99214 OFFICE O/P EST MOD 30 MIN: CPT | Mod: S$GLB,,, | Performed by: PAIN MEDICINE

## 2020-03-12 PROCEDURE — 1101F PT FALLS ASSESS-DOCD LE1/YR: CPT | Mod: CPTII,S$GLB,, | Performed by: PAIN MEDICINE

## 2020-03-12 RX ORDER — METHYLPREDNISOLONE 4 MG/1
TABLET ORAL
Qty: 1 PACKAGE | Refills: 0 | Status: SHIPPED | OUTPATIENT
Start: 2020-03-12 | End: 2020-08-18

## 2020-03-12 NOTE — PATIENT INSTRUCTIONS
-obtain cervical spine MRI  -will obtain bilateral shoulder x-rays  -will start Medrol Dosepak  -patient has referral physical therapy  -follow up in clinic in 6 weeks

## 2020-03-12 NOTE — PROGRESS NOTES
Chief Pain Complaint:  Chief Complaint   Patient presents with    Follow-up    Neck pain and shoulder pain    This note was created using voice recognition, there may be topographical errors that were missed during proofreading.    History of Present Illness:   Ms. Mccoy returns to clinic for follow-up.  Today she has complaints mostly cervical spine pain in addition to bilateral shoulder pain.  The shoulders were very intensity and sometimes right shoulder bothers her on the left shoulder does not give her any trouble and vice versa.  Today she rates her pain as a 5/10.  Today she finds that her right shoulder seems to be the worst specifically trapezius muscle.  She denies having symptoms that radiate in her upper extremities.  She denies having numbness weakness in her arms.  She has been offered referral for physical therapy by primary care however she has yet to start.  She started on tizanidine recently which does provide minimal pain relief however she does not like to take too many medications.  She does take Ativan at night which helps with anxiety.    Initial HPI:  This patient is a 79 y.o. female who presents today complaining of the above noted pain/s. The patient describes the pain as follows.  Ms. Mccoy is the patient complaints of cervical spine pain which started approximately 3 months ago with no inciting event.  Currently she rates her pain as a 7/10 in aching sensation which is worse with activity and improved with Tylenol.  She does have headaches from time to time which are well controlled Tylenol and ibuprofen.  She denies having any radiating symptoms in her upper extremities and denies having numbness and weakness.  She has not participated in physical therapy, denies having surgery on cervical spine, denies having any cervical epidural steroid injections in the past.  She denies having bowel bladder difficulty.    Previous Therapy:  Medications:  Tylenol, ibuprofen, tizanidine,  Ativan  Injections: None  Surgeries: None  Physical Therapy: None    Past Surgical History:   Procedure Laterality Date    CATARACT EXTRACTION W/  INTRAOCULAR LENS IMPLANT  OU     Imaging / Labs / Studies (reviewed on 3/12/2020):  Results for orders placed during the hospital encounter of 08/26/19   X-Ray Cervical Spine AP And Lateral    Narrative EXAMINATION:  XR CERVICAL SPINE AP LATERAL    CLINICAL HISTORY:  Cervicalgia    TECHNIQUE:  AP, lateral and open mouth views of the cervical spine were performed.    COMPARISON:  None.    FINDINGS:  There is visualization T1-2 level on the swimmer's view.    Vertebral body height well maintained.  Disc spaces appear adequate at all levels.  There is multilevel anterior marginal spurring most prominently at the C4-5 and C5-6 levels.  Mild anterior subluxation C6 on C7 noted on both the lateral and swimmer's lateral views.    Prevertebral soft tissues within normal limits.  C1-2 articulation and odontoid normal in appearance allowing for positioning.  Pre dens space normal.  Incidental note made of calcification the area of the left carotid vessels.      Impression Cervical spondylosis without fracture noted.    Minimal anterior subluxation C6 on C7.    Additional findings as above.     Review of Systems:  Review of Systems   Constitutional: Negative for fever.   Eyes: Negative for blurred vision.   Respiratory: Negative for cough and wheezing.    Cardiovascular: Negative for chest pain and orthopnea.   Gastrointestinal: Negative for constipation, diarrhea, nausea and vomiting.   Genitourinary: Negative for dysuria.   Musculoskeletal: Positive for joint pain and neck pain.        Bilateral shoulder pain   Skin: Negative for itching and rash.   Neurological: Negative for weakness.   Endo/Heme/Allergies: Does not bruise/bleed easily.       Physical Exam:  There were no vitals taken for this visit. (reviewed on 3/12/2020)\  General    Constitutional: She is oriented to person,  place, and time. She appears well-developed and well-nourished.   HENT:   Head: Normocephalic and atraumatic.   Eyes: EOM are normal.   Neck: Neck supple.   Pulmonary/Chest: Effort normal.   Abdominal: She exhibits no distension.   Neurological: She is alert and oriented to person, place, and time. No cranial nerve deficit.   Psychiatric: She has a normal mood and affect.     General Musculoskeletal Exam   Gait: normal     Back (L-Spine & T-Spine) / Neck (C-Spine) Exam     Tenderness Right paramedian tenderness of the Lower C-Spine. Left paramedian tenderness of the Lower C-Spine.     Neck (C-Spine) Range of Motion   Flexion:     Normal  Extension: Normal  Right Lateral Bend: normal  Left Lateral Bend: normal  Right Rotation: normal  Left Rotation: normal    Spinal Sensation   Right Side Sensation  C-Spine Level: normal   Left Side Sensation  C-Spine Level: normal    Neck (C-Spine) Tests   Spurling's Test   Left:  Negative  Right: negative    Comments:  Increased pain with cervical extension right lateral rotation; right shoulder exam increasing pain with cross-body reach, Lopez Adam; negative increase in pain with Neer's and belly press and lift-off; minimal tenderness to palpation over right glenohumeral joint      Muscle Strength   Right Upper Extremity   Biceps: 5/5/5   Deltoid:  5/5  Triceps:  5/5  Left Upper Extremity  Biceps: 5/5/5   Deltoid:  5/5  Triceps:  5/5    Reflexes     Left Side  Biceps:  2+  Triceps:  2+  Brachioradialis:  2+    Right Side   Biceps:  2+  Triceps:  2+  Brachioradialis:  2+      Assessment  Cervical Spondylosis  Headache    1. 79 y.o. year old patient with PMH of   Past Medical History:   Diagnosis Date    Anxiety     followed by Dr. Mabry    Bronchitis     Carotid stenosis     Depression     followed by Dr. Mabry    Diabetes mellitus type II     Hyperlipidemia     Hyperparathyroidism, unspecified     Has appointment to establish with Dr. Gutierrez    Hypertension      Leukopenia     Vertigo     followed by ENT    Vitamin B12 deficiency     Vitamin D deficiency       presenting with pain located cervical spine  2. Pain Generators / Etiology: Cervical Spondylosis  3. Failed Meds (E- Effective, NE- Not Effective):  Tylenol-effective, ibuprofen-effective  4. Physical Therapy - None  5. Psychological comorbidities - Depression and Anxiety  6. Anticoagulants / Antiplatelets: Aspirin 81mg     PLAN:  1. Medications:  Continue Tylenol and ibuprofen over-the-counter as needed; will provide prescription for Medrol Dosepak  2. PT - patient has referral physical therapy however if physical therapy has not contacted her by Monday, have instructed her to contact our clinic  3. Psychological - continue all medications as prescribed; continue the Ativan as prescribed  4. Labs - obtain  none  5. Imaging - obtain cervical spine MRI, bilateral shoulder x-rays  6. Interventions - schedule none; of offer patient trigger point injections today however she has declined; can consider cervical epidural steroid injection the future  7. Referrals - none  8. Records - none  9. Follow up visit - follow up in clinic in 6 weeks  10. Patient Questions - answered all of the patient's questions regarding diagnosis, therapy, and treatment  11.  This condition does not require this patient to take time off of work    PRESTON Mejias MD  Interventional Pain  [facility] [place]

## 2020-03-12 NOTE — LETTER
March 12, 2020      [place]  [id number] [place] [address] [address] [place] [address]           [facility]  [address] [place] [address] [address] [place] [address]  Phone: [phone]  Fax: [phone]          Patient: Grazyna Mccoy   MR Number: [id number]   YOB: 1940   Date of Visit: 3/12/2020       Dear Ayah Palomino:    Thank you for referring Grazyna Mccoy to me for evaluation. Attached you will find relevant portions of my assessment and plan of care.    If you have questions, please do not hesitate to call me. I look forward to following Grazyna Mccoy along with you.    Sincerely,    Dayday Mejias MD    Enclosure  CC:  No Recipients    If you would like to receive this communication electronically, please contact [email or web address] or [phone] to request more information on [employer] access.    For providers and/or their staff who would like to refer a patient to [facility], please contact us through our one-stop-shop provider referral line, [facility] , at [phone].    If you feel you have received this communication in error or would no longer like to receive these types of communications, please e-mail [email or web address]

## 2020-03-18 ENCOUNTER — TELEPHONE (OUTPATIENT)
Dept: INTERNAL MEDICINE | Facility: CLINIC | Age: 80
End: 2020-03-18

## 2020-03-18 NOTE — TELEPHONE ENCOUNTER
----- Message from Miryam Bateman sent at 3/18/2020  2:02 PM CDT -----  Contact: Isabell PT  Requesting a call back regarding orders that is needed to be faxed over for outpatient PT. Please call back at [phone]  Fax number is [phone]

## 2020-03-23 RX ORDER — TIZANIDINE 2 MG/1
2 TABLET ORAL EVERY 8 HOURS
Qty: 30 TABLET | Refills: 0 | Status: SHIPPED | OUTPATIENT
Start: 2020-03-23 | End: 2020-04-02

## 2020-03-23 NOTE — TELEPHONE ENCOUNTER
----- Message from Jess Botello sent at 3/23/2020 12:08 PM CDT -----  Contact: self [phone]  .Type:  RX Refill Request    Who Called: Grazyna Mccoy  Refill or New Rx:refill  RX Name and Strength:Tizanidine 2mg  How is the patient currently taking it? (ex. 1XDay):dialy  Is this a 30 day or 90 day RX:30  Preferred Pharmacy with phone number:.      [facility]/pharmacy #[id number] - [place], [place] - [address] [address]  [address] [facility] [address]  Phone: [phone] Fax: [phone]      Local or Mail Order:local  Ordering Provider:Ayah Palomino  Would the patient rather a call back or a response via [facility]? Call back  Best Call Back Number:[phone]  Additional Information:

## 2020-03-23 NOTE — TELEPHONE ENCOUNTER
----- Message from Jess Botello sent at 3/23/2020 12:08 PM CDT -----  Contact: self [phone]  .Type:  RX Refill Request    Who Called: Grazyna Mccoy  Refill or New Rx:refill  RX Name and Strength:Tizanidine 2mg  How is the patient currently taking it? (ex. 1XDay):dialy  Is this a 30 day or 90 day RX:30  Preferred Pharmacy with phone number:.      [facility]/pharmacy #[id number] - [place], [place] - [address] [address] [facility]  [address] [facility] [address]  Phone: [phone] Fax: [phone]      Local or Mail Order:local  Ordering Provider:Ayah Palomino  Would the patient rather a call back or a response via [facility]? Call back  Best Call Back Number:[phone]  Additional Information:

## 2020-03-24 ENCOUNTER — TELEPHONE (OUTPATIENT)
Dept: INTERNAL MEDICINE | Facility: CLINIC | Age: 80
End: 2020-03-24

## 2020-03-24 NOTE — TELEPHONE ENCOUNTER
----- Message from Neda Sifuentes sent at 3/24/2020 10:48 AM CDT -----  Contact: pt  1. What is the name of the medication you are requesting? Prednisone   2. What is the dose? n/a  3. How do you take the medication? Orally, topically, etc? n/a  4. How often do you take this medication? n/a  5. Do you need a 30 day or 90 day supply? n/a  6. How many refills are you requesting? n/a  7. What is your preferred pharmacy and location of the pharmacy? [facility] in [place]  8. Who can we contact with further questions? The pt at [phone]

## 2020-03-24 NOTE — TELEPHONE ENCOUNTER
Spoke with pt states she is still having shoulder and neck pain.   Pt asking what recommendations you have?   States she is taking the zanaflex at night but is not helping much.

## 2020-03-25 ENCOUNTER — TELEPHONE (OUTPATIENT)
Dept: PAIN MEDICINE | Facility: CLINIC | Age: 80
End: 2020-03-25

## 2020-03-25 RX ORDER — GABAPENTIN 300 MG/1
CAPSULE ORAL
Qty: 90 CAPSULE | Refills: 3 | Status: SHIPPED | OUTPATIENT
Start: 2020-03-25 | End: 2020-03-26 | Stop reason: SDUPTHER

## 2020-03-25 NOTE — TELEPHONE ENCOUNTER
gabapentin (NEURONTIN) 300 MG capsule 90 capsule 3 3/25/2020     Sig: Take 1 capsule at bedtime for one week, then take 1 capsule at breakfast and bedtime for 1 week, then take 1 capsule three times daily.      Contacted pt. Pt notified directions. All questions answer. //lp

## 2020-03-25 NOTE — TELEPHONE ENCOUNTER
----- Message from Dayday Mejias MD sent at 3/25/2020 12:02 PM CDT -----  Contact: pt  Ok, I have sent in gabapentin for her to try.  ----- Message -----  From: Luly Jimenez LPN  Sent: 3/24/2020  12:31 PM CDT  To: Dayday Mejias MD    Spoke with pt states she is still having shoulder and neck pain.   Pt asking what recommendations you have?   States she is taking the zanaflex at night but is not helping much.    ----- Message -----  From: Dayday Mejias MD  Sent: 3/24/2020  12:07 PM CDT  To: Luly Jimenez LPN    Will hold off on another Medrol dose pack at this time since she just completed one a few days ago.  Looks like the cervical MRI has not been completed.  If she needs something for pain, I can send in something for her.  ----- Message -----  From: Luly Jimenez LPN  Sent: 3/24/2020  11:33 AM CDT  To: Dayday Mejias MD    Pt requesting another medrol dose pack. Please advise.       ----- Message -----  From: Neda Sifuentes  Sent: 3/24/2020  10:48 AM CDT  To: Magalie Naylor Staff, Jeniffer RAMOS Staff    1. What is the name of the medication you are requesting? Prednisone   2. What is the dose? n/a  3. How do you take the medication? Orally, topically, etc? n/a  4. How often do you take this medication? n/a  5. Do you need a 30 day or 90 day supply? n/a  6. How many refills are you requesting? n/a  7. What is your preferred pharmacy and location of the pharmacy? [facility] [place]  8. Who can we contact with further questions? The pt at [phone]

## 2020-03-26 ENCOUNTER — TELEPHONE (OUTPATIENT)
Dept: PAIN MEDICINE | Facility: CLINIC | Age: 80
End: 2020-03-26

## 2020-03-26 RX ORDER — GABAPENTIN 300 MG/1
CAPSULE ORAL
Qty: 90 CAPSULE | Refills: 3 | Status: SHIPPED | OUTPATIENT
Start: 2020-03-26 | End: 2020-08-18

## 2020-03-26 NOTE — TELEPHONE ENCOUNTER
----- Message from Dayday Mejias MD sent at 3/26/2020  9:29 AM CDT -----  Done!  ----- Message -----  From: Luly Jimenez LPN  Sent: 3/25/2020   1:28 PM CDT  To: Dayday Mejias MD    Can you send gabapentin to [id number] - [place], [place] - [address] [facility] [phone] (Phone)?

## 2020-06-03 ENCOUNTER — OFFICE VISIT (OUTPATIENT)
Dept: INTERNAL MEDICINE | Facility: CLINIC | Age: 80
End: 2020-06-03
Payer: MEDICARE

## 2020-06-03 VITALS
HEART RATE: 83 BPM | OXYGEN SATURATION: 95 % | TEMPERATURE: 97 F | SYSTOLIC BLOOD PRESSURE: 150 MMHG | BODY MASS INDEX: 35 KG/M2 | HEIGHT: 67 IN | DIASTOLIC BLOOD PRESSURE: 70 MMHG | WEIGHT: 223 LBS

## 2020-06-03 DIAGNOSIS — I15.2 HYPERTENSION ASSOCIATED WITH DIABETES: Primary | ICD-10-CM

## 2020-06-03 DIAGNOSIS — E11.59 HYPERTENSION ASSOCIATED WITH DIABETES: Primary | ICD-10-CM

## 2020-06-03 DIAGNOSIS — F41.8 DEPRESSION WITH ANXIETY: ICD-10-CM

## 2020-06-03 PROCEDURE — 1126F PR PAIN SEVERITY QUANTIFIED, NO PAIN PRESENT: ICD-10-PCS | Mod: S$GLB,,, | Performed by: FAMILY MEDICINE

## 2020-06-03 PROCEDURE — 99999 PR PBB SHADOW E&M-EST. PATIENT-LVL IV: CPT | Mod: PBBFAC,,, | Performed by: FAMILY MEDICINE

## 2020-06-03 PROCEDURE — 1159F MED LIST DOCD IN RCRD: CPT | Mod: S$GLB,,, | Performed by: FAMILY MEDICINE

## 2020-06-03 PROCEDURE — 1101F PR PT FALLS ASSESS DOC 0-1 FALLS W/OUT INJ PAST YR: ICD-10-PCS | Mod: CPTII,S$GLB,, | Performed by: FAMILY MEDICINE

## 2020-06-03 PROCEDURE — 3077F PR MOST RECENT SYSTOLIC BLOOD PRESSURE >= 140 MM HG: ICD-10-PCS | Mod: CPTII,S$GLB,, | Performed by: FAMILY MEDICINE

## 2020-06-03 PROCEDURE — 3051F PR MOST RECENT HEMOGLOBIN A1C LEVEL 7.0 - < 8.0%: ICD-10-PCS | Mod: CPTII,S$GLB,, | Performed by: FAMILY MEDICINE

## 2020-06-03 PROCEDURE — 1101F PT FALLS ASSESS-DOCD LE1/YR: CPT | Mod: CPTII,S$GLB,, | Performed by: FAMILY MEDICINE

## 2020-06-03 PROCEDURE — 3078F DIAST BP <80 MM HG: CPT | Mod: CPTII,S$GLB,, | Performed by: FAMILY MEDICINE

## 2020-06-03 PROCEDURE — 3077F SYST BP >= 140 MM HG: CPT | Mod: CPTII,S$GLB,, | Performed by: FAMILY MEDICINE

## 2020-06-03 PROCEDURE — 1159F PR MEDICATION LIST DOCUMENTED IN MEDICAL RECORD: ICD-10-PCS | Mod: S$GLB,,, | Performed by: FAMILY MEDICINE

## 2020-06-03 PROCEDURE — 99214 OFFICE O/P EST MOD 30 MIN: CPT | Mod: S$GLB,,, | Performed by: FAMILY MEDICINE

## 2020-06-03 PROCEDURE — 3078F PR MOST RECENT DIASTOLIC BLOOD PRESSURE < 80 MM HG: ICD-10-PCS | Mod: CPTII,S$GLB,, | Performed by: FAMILY MEDICINE

## 2020-06-03 PROCEDURE — 3051F HG A1C>EQUAL 7.0%<8.0%: CPT | Mod: CPTII,S$GLB,, | Performed by: FAMILY MEDICINE

## 2020-06-03 PROCEDURE — 1126F AMNT PAIN NOTED NONE PRSNT: CPT | Mod: S$GLB,,, | Performed by: FAMILY MEDICINE

## 2020-06-03 PROCEDURE — 99999 PR PBB SHADOW E&M-EST. PATIENT-LVL IV: ICD-10-PCS | Mod: PBBFAC,,, | Performed by: FAMILY MEDICINE

## 2020-06-03 PROCEDURE — 99214 PR OFFICE/OUTPT VISIT, EST, LEVL IV, 30-39 MIN: ICD-10-PCS | Mod: S$GLB,,, | Performed by: FAMILY MEDICINE

## 2020-06-03 RX ORDER — LOSARTAN POTASSIUM 50 MG/1
50 TABLET ORAL DAILY
Qty: 90 TABLET | Refills: 1 | Status: SHIPPED | OUTPATIENT
Start: 2020-06-03 | End: 2020-08-18

## 2020-06-03 RX ORDER — INSULIN PUMP SYRINGE, 3 ML
EACH MISCELLANEOUS
Qty: 1 EACH | Refills: 0 | Status: SHIPPED | OUTPATIENT
Start: 2020-06-03 | End: 2020-07-21

## 2020-06-03 RX ORDER — LANCETS
EACH MISCELLANEOUS
Qty: 100 EACH | Refills: 11 | Status: SHIPPED | OUTPATIENT
Start: 2020-06-03

## 2020-06-03 NOTE — PATIENT INSTRUCTIONS
[facility]  [phone]    Patient was advised to seek immediate medical attention for severe headache, vision changes, chest pain, shortness of breath, speech difficulty, altered mental status, tingling/numbness/weakness or other focal neurologic deficits.

## 2020-06-03 NOTE — ASSESSMENT & PLAN NOTE
Elevated today; patient does not desire to increase her medication; she will continue losartan 50 mg daily; will enroll in digital hypertension program for monitoring and consider increase in medication if elevated outpatient. Reassess in clinic at annual in August.

## 2020-06-03 NOTE — PROGRESS NOTES
Subjective:       Patient ID: Grazyna Mccoy is a 79 y.o. female.    Chief Complaint: Blood Pressure Check    Patient presents to clinic today for followup of blood pressure. She reports she resumed losartan 50 mg daily reporting 100 mg is too much. She also reports her psychiatrist is leaving and would like to establish with [facility]. Patient also requests new glucometer.        Review of Systems   Constitutional: Negative for chills, fatigue, fever and unexpected weight change.   Eyes: Negative for visual disturbance.   Respiratory: Negative for shortness of breath.    Cardiovascular: Negative for chest pain.   Musculoskeletal: Negative for myalgias.   Neurological: Negative for headaches.       Objective:      Physical Exam   Constitutional: She is oriented to person, place, and time. She appears well-developed and well-nourished. No distress.   HENT:   Head: Normocephalic and atraumatic.   Eyes: Pupils are equal, round, and reactive to light. Conjunctivae and EOM are normal. No scleral icterus.   Pulmonary/Chest: Effort normal.   Neurological: She is alert and oriented to person, place, and time. No cranial nerve deficit. Gait normal.   Psychiatric: She has a normal mood and affect.   Vitals reviewed.      Assessment:       1. Hypertension associated with diabetes    2. Depression with anxiety    3. Diabetes mellitus type 2, uncontrolled, with complications        Plan:     Problem List Items Addressed This Visit     Depression with anxiety    Current Assessment & Plan     Previously followed by Dr. Mabry, Psychiatry; referral to establish care with [facility]         Relevant Orders    Ambulatory referral/consult to Psychiatry    Diabetes mellitus type 2, uncontrolled, with complications    Relevant Medications    blood-glucose meter kit    lancets Misc    blood sugar diagnostic Strp    Hypertension associated with diabetes - Primary    Current Assessment & Plan     Elevated today; patient  does not desire to increase her medication; she will continue losartan 50 mg daily; will enroll in digital hypertension program for monitoring and consider increase in medication if elevated outpatient. Reassess in clinic at annual in August.         Relevant Medications    losartan (COZAAR) 50 MG tablet    Other Relevant Orders    Hypertension Digital Medicine (HDMP) Enrollment Order (Completed)    Hypertension Digital Medicine ([facility]): Assign Onboarding Questionnaires (Completed)

## 2020-07-23 ENCOUNTER — PATIENT OUTREACH (OUTPATIENT)
Dept: ADMINISTRATIVE | Facility: OTHER | Age: 80
End: 2020-07-23

## 2020-07-23 NOTE — PROGRESS NOTES
Requested updates within Care Everywhere.  Patient's chart was reviewed for overdue KELLY topics.  Immunizations reconciled.    Eye exam scheduled 7/24/20.

## 2020-07-24 ENCOUNTER — OFFICE VISIT (OUTPATIENT)
Dept: OPHTHALMOLOGY | Facility: CLINIC | Age: 80
End: 2020-07-24
Payer: MEDICARE

## 2020-07-24 DIAGNOSIS — Z96.1 PSEUDOPHAKIA OF BOTH EYES: ICD-10-CM

## 2020-07-24 DIAGNOSIS — H52.7 REFRACTIVE ERROR: ICD-10-CM

## 2020-07-24 DIAGNOSIS — E11.9 DIABETES MELLITUS TYPE 2 WITHOUT RETINOPATHY: Primary | ICD-10-CM

## 2020-07-24 PROCEDURE — 92014 PR EYE EXAM, EST PATIENT,COMPREHESV: ICD-10-PCS | Mod: S$GLB,,, | Performed by: OPTOMETRIST

## 2020-07-24 PROCEDURE — 92015 PR REFRACTION: ICD-10-PCS | Mod: S$GLB,,, | Performed by: OPTOMETRIST

## 2020-07-24 PROCEDURE — 92015 DETERMINE REFRACTIVE STATE: CPT | Mod: S$GLB,,, | Performed by: OPTOMETRIST

## 2020-07-24 PROCEDURE — 99999 PR PBB SHADOW E&M-EST. PATIENT-LVL III: ICD-10-PCS | Mod: PBBFAC,,, | Performed by: OPTOMETRIST

## 2020-07-24 PROCEDURE — 99999 PR PBB SHADOW E&M-EST. PATIENT-LVL III: CPT | Mod: PBBFAC,,, | Performed by: OPTOMETRIST

## 2020-07-24 PROCEDURE — 92014 COMPRE OPH EXAM EST PT 1/>: CPT | Mod: S$GLB,,, | Performed by: OPTOMETRIST

## 2020-07-24 NOTE — PROGRESS NOTES
HPI     NIDDM exam.  Decrease near visual acuity with glasses.  Last eye exam 07/19/2019 [facility].  New patient to [facility].  Update glasses RX.  Lab Results       Component                Value               Date                       HGBA1C                   7.9 (H)             02/26/2020           Last edited by Sidney Zamarripa, OD on 7/24/2020 11:02 AM. (History)            Assessment /Plan     For exam results, see Encounter Report.    Diabetes mellitus type 2 without retinopathy    Pseudophakia of both eyes    Refractive error      No Background Diabetic Retinopathy    Stable IOL OU.    Dispense Final Rx for glasses.  RTC 1 year  Discussed above and answered questions.

## 2020-08-11 ENCOUNTER — LAB VISIT (OUTPATIENT)
Dept: LAB | Facility: HOSPITAL | Age: 80
End: 2020-08-11
Attending: FAMILY MEDICINE
Payer: MEDICARE

## 2020-08-11 DIAGNOSIS — E11.69 HYPERLIPIDEMIA ASSOCIATED WITH TYPE 2 DIABETES MELLITUS: ICD-10-CM

## 2020-08-11 DIAGNOSIS — E53.8 VITAMIN B12 DEFICIENCY: ICD-10-CM

## 2020-08-11 DIAGNOSIS — E11.9 DIABETES MELLITUS TYPE 2, DIET-CONTROLLED: ICD-10-CM

## 2020-08-11 DIAGNOSIS — E78.5 HYPERLIPIDEMIA ASSOCIATED WITH TYPE 2 DIABETES MELLITUS: ICD-10-CM

## 2020-08-11 DIAGNOSIS — E11.59 HYPERTENSION ASSOCIATED WITH DIABETES: ICD-10-CM

## 2020-08-11 DIAGNOSIS — I15.2 HYPERTENSION ASSOCIATED WITH DIABETES: ICD-10-CM

## 2020-08-11 DIAGNOSIS — E55.9 VITAMIN D DEFICIENCY: ICD-10-CM

## 2020-08-11 LAB
25(OH)D3+25(OH)D2 SERPL-MCNC: 27 NG/ML (ref 30–96)
ALBUMIN SERPL BCP-MCNC: 3.7 G/DL (ref 3.5–5.2)
ALP SERPL-CCNC: 75 U/L (ref 55–135)
ALT SERPL W/O P-5'-P-CCNC: 12 U/L (ref 10–44)
ANION GAP SERPL CALC-SCNC: 7 MMOL/L (ref 8–16)
AST SERPL-CCNC: 18 U/L (ref 10–40)
BASOPHILS # BLD AUTO: 0.05 K/UL (ref 0–0.2)
BASOPHILS NFR BLD: 0.7 % (ref 0–1.9)
BILIRUB SERPL-MCNC: 0.5 MG/DL (ref 0.1–1)
BUN SERPL-MCNC: 10 MG/DL (ref 8–23)
CALCIUM SERPL-MCNC: 9.2 MG/DL (ref 8.7–10.5)
CHLORIDE SERPL-SCNC: 103 MMOL/L (ref 95–110)
CHOLEST SERPL-MCNC: 155 MG/DL (ref 120–199)
CHOLEST/HDLC SERPL: 3.4 {RATIO} (ref 2–5)
CO2 SERPL-SCNC: 28 MMOL/L (ref 23–29)
CREAT SERPL-MCNC: 0.8 MG/DL (ref 0.5–1.4)
DIFFERENTIAL METHOD: ABNORMAL
EOSINOPHIL # BLD AUTO: 0.2 K/UL (ref 0–0.5)
EOSINOPHIL NFR BLD: 3.1 % (ref 0–8)
ERYTHROCYTE [DISTWIDTH] IN BLOOD BY AUTOMATED COUNT: 12.9 % (ref 11.5–14.5)
EST. GFR  (AFRICAN AMERICAN): >60 ML/MIN/1.73 M^2
EST. GFR  (NON AFRICAN AMERICAN): >60 ML/MIN/1.73 M^2
ESTIMATED AVG GLUCOSE: 183 MG/DL (ref 68–131)
GLUCOSE SERPL-MCNC: 159 MG/DL (ref 70–110)
HBA1C MFR BLD HPLC: 8 % (ref 4–5.6)
HCT VFR BLD AUTO: 41.3 % (ref 37–48.5)
HDLC SERPL-MCNC: 45 MG/DL (ref 40–75)
HDLC SERPL: 29 % (ref 20–50)
HGB BLD-MCNC: 13.1 G/DL (ref 12–16)
IMM GRANULOCYTES # BLD AUTO: 0.02 K/UL (ref 0–0.04)
IMM GRANULOCYTES NFR BLD AUTO: 0.3 % (ref 0–0.5)
LDLC SERPL CALC-MCNC: 85.2 MG/DL (ref 63–159)
LYMPHOCYTES # BLD AUTO: 2.1 K/UL (ref 1–4.8)
LYMPHOCYTES NFR BLD: 31.7 % (ref 18–48)
MCH RBC QN AUTO: 28.7 PG (ref 27–31)
MCHC RBC AUTO-ENTMCNC: 31.7 G/DL (ref 32–36)
MCV RBC AUTO: 90 FL (ref 82–98)
MONOCYTES # BLD AUTO: 0.5 K/UL (ref 0.3–1)
MONOCYTES NFR BLD: 8 % (ref 4–15)
NEUTROPHILS # BLD AUTO: 3.8 K/UL (ref 1.8–7.7)
NEUTROPHILS NFR BLD: 56.2 % (ref 38–73)
NONHDLC SERPL-MCNC: 110 MG/DL
NRBC BLD-RTO: 0 /100 WBC
PLATELET # BLD AUTO: 297 K/UL (ref 150–350)
PMV BLD AUTO: 10.9 FL (ref 9.2–12.9)
POTASSIUM SERPL-SCNC: 4.3 MMOL/L (ref 3.5–5.1)
PROT SERPL-MCNC: 7.1 G/DL (ref 6–8.4)
RBC # BLD AUTO: 4.57 M/UL (ref 4–5.4)
SODIUM SERPL-SCNC: 138 MMOL/L (ref 136–145)
TRIGL SERPL-MCNC: 124 MG/DL (ref 30–150)
TSH SERPL DL<=0.005 MIU/L-ACNC: 3.05 UIU/ML (ref 0.4–4)
VIT B12 SERPL-MCNC: 273 PG/ML (ref 210–950)
WBC # BLD AUTO: 6.75 K/UL (ref 3.9–12.7)

## 2020-08-11 PROCEDURE — 80053 COMPREHEN METABOLIC PANEL: CPT

## 2020-08-11 PROCEDURE — 83036 HEMOGLOBIN GLYCOSYLATED A1C: CPT

## 2020-08-11 PROCEDURE — 80061 LIPID PANEL: CPT

## 2020-08-11 PROCEDURE — 82607 VITAMIN B-12: CPT

## 2020-08-11 PROCEDURE — 82306 VITAMIN D 25 HYDROXY: CPT

## 2020-08-11 PROCEDURE — 84443 ASSAY THYROID STIM HORMONE: CPT

## 2020-08-11 PROCEDURE — 36415 COLL VENOUS BLD VENIPUNCTURE: CPT

## 2020-08-11 PROCEDURE — 85025 COMPLETE CBC W/AUTO DIFF WBC: CPT

## 2020-08-18 ENCOUNTER — OFFICE VISIT (OUTPATIENT)
Dept: INTERNAL MEDICINE | Facility: CLINIC | Age: 80
End: 2020-08-18
Payer: MEDICARE

## 2020-08-18 VITALS
DIASTOLIC BLOOD PRESSURE: 74 MMHG | OXYGEN SATURATION: 95 % | BODY MASS INDEX: 34.53 KG/M2 | TEMPERATURE: 98 F | SYSTOLIC BLOOD PRESSURE: 138 MMHG | HEART RATE: 85 BPM | WEIGHT: 220 LBS | HEIGHT: 67 IN

## 2020-08-18 DIAGNOSIS — I15.2 HYPERTENSION ASSOCIATED WITH DIABETES: ICD-10-CM

## 2020-08-18 DIAGNOSIS — Z78.9 STATIN INTOLERANCE: ICD-10-CM

## 2020-08-18 DIAGNOSIS — Z11.59 NEED FOR HEPATITIS C SCREENING TEST: ICD-10-CM

## 2020-08-18 DIAGNOSIS — E11.59 HYPERTENSION ASSOCIATED WITH DIABETES: ICD-10-CM

## 2020-08-18 DIAGNOSIS — E78.5 HYPERLIPIDEMIA ASSOCIATED WITH TYPE 2 DIABETES MELLITUS: ICD-10-CM

## 2020-08-18 DIAGNOSIS — E55.9 VITAMIN D DEFICIENCY: ICD-10-CM

## 2020-08-18 DIAGNOSIS — E53.8 VITAMIN B12 DEFICIENCY: ICD-10-CM

## 2020-08-18 DIAGNOSIS — F41.8 DEPRESSION WITH ANXIETY: ICD-10-CM

## 2020-08-18 DIAGNOSIS — E11.69 HYPERLIPIDEMIA ASSOCIATED WITH TYPE 2 DIABETES MELLITUS: ICD-10-CM

## 2020-08-18 PROCEDURE — 1126F PR PAIN SEVERITY QUANTIFIED, NO PAIN PRESENT: ICD-10-PCS | Mod: S$GLB,,, | Performed by: FAMILY MEDICINE

## 2020-08-18 PROCEDURE — 1126F AMNT PAIN NOTED NONE PRSNT: CPT | Mod: S$GLB,,, | Performed by: FAMILY MEDICINE

## 2020-08-18 PROCEDURE — 99214 OFFICE O/P EST MOD 30 MIN: CPT | Mod: S$GLB,,, | Performed by: FAMILY MEDICINE

## 2020-08-18 PROCEDURE — 3075F PR MOST RECENT SYSTOLIC BLOOD PRESS GE 130-139MM HG: ICD-10-PCS | Mod: CPTII,S$GLB,, | Performed by: FAMILY MEDICINE

## 2020-08-18 PROCEDURE — 1159F PR MEDICATION LIST DOCUMENTED IN MEDICAL RECORD: ICD-10-PCS | Mod: S$GLB,,, | Performed by: FAMILY MEDICINE

## 2020-08-18 PROCEDURE — 99999 PR PBB SHADOW E&M-EST. PATIENT-LVL III: ICD-10-PCS | Mod: PBBFAC,,, | Performed by: FAMILY MEDICINE

## 2020-08-18 PROCEDURE — 1159F MED LIST DOCD IN RCRD: CPT | Mod: S$GLB,,, | Performed by: FAMILY MEDICINE

## 2020-08-18 PROCEDURE — 3075F SYST BP GE 130 - 139MM HG: CPT | Mod: CPTII,S$GLB,, | Performed by: FAMILY MEDICINE

## 2020-08-18 PROCEDURE — 3052F HG A1C>EQUAL 8.0%<EQUAL 9.0%: CPT | Mod: CPTII,S$GLB,, | Performed by: FAMILY MEDICINE

## 2020-08-18 PROCEDURE — 99999 PR PBB SHADOW E&M-EST. PATIENT-LVL III: CPT | Mod: PBBFAC,,, | Performed by: FAMILY MEDICINE

## 2020-08-18 PROCEDURE — 3078F DIAST BP <80 MM HG: CPT | Mod: CPTII,S$GLB,, | Performed by: FAMILY MEDICINE

## 2020-08-18 PROCEDURE — 1101F PT FALLS ASSESS-DOCD LE1/YR: CPT | Mod: CPTII,S$GLB,, | Performed by: FAMILY MEDICINE

## 2020-08-18 PROCEDURE — 99214 PR OFFICE/OUTPT VISIT, EST, LEVL IV, 30-39 MIN: ICD-10-PCS | Mod: S$GLB,,, | Performed by: FAMILY MEDICINE

## 2020-08-18 PROCEDURE — 1101F PR PT FALLS ASSESS DOC 0-1 FALLS W/OUT INJ PAST YR: ICD-10-PCS | Mod: CPTII,S$GLB,, | Performed by: FAMILY MEDICINE

## 2020-08-18 PROCEDURE — 3052F PR MOST RECENT HEMOGLOBIN A1C LEVEL 8.0 - < 9.0%: ICD-10-PCS | Mod: CPTII,S$GLB,, | Performed by: FAMILY MEDICINE

## 2020-08-18 PROCEDURE — 3078F PR MOST RECENT DIASTOLIC BLOOD PRESSURE < 80 MM HG: ICD-10-PCS | Mod: CPTII,S$GLB,, | Performed by: FAMILY MEDICINE

## 2020-08-18 RX ORDER — METFORMIN HYDROCHLORIDE 500 MG/1
500 TABLET ORAL
Qty: 90 TABLET | Refills: 1 | Status: SHIPPED | OUTPATIENT
Start: 2020-08-18 | End: 2020-12-20

## 2020-08-18 RX ORDER — LOSARTAN POTASSIUM 100 MG/1
100 TABLET ORAL DAILY
COMMUNITY
Start: 2020-08-04

## 2020-08-18 RX ORDER — LANOLIN ALCOHOL/MO/W.PET/CERES
1000 CREAM (GRAM) TOPICAL DAILY
COMMUNITY
Start: 2020-08-18

## 2020-08-18 NOTE — PROGRESS NOTES
Subjective:       Patient ID: Grazyna Mccoy is a 80 y.o. female.    Chief Complaint: Follow-up    Patient presents to clinic today for followup of chronic conditions.  Diabetes above goal on diet control.  Hypertension controlled on losartan.  Hyperlipidemia stable on Zetia.    Review of Systems   Constitutional: Negative for chills, fatigue, fever and unexpected weight change.   Eyes: Negative for visual disturbance.   Respiratory: Negative for shortness of breath.    Cardiovascular: Negative for chest pain.   Musculoskeletal: Negative for myalgias.   Neurological: Negative for headaches.         Objective:      Physical Exam  Vitals signs reviewed.   Constitutional:       General: She is not in acute distress.     Appearance: She is well-developed.   HENT:      Head: Normocephalic and atraumatic.   Eyes:      General: Lids are normal. No scleral icterus.     Extraocular Movements: Extraocular movements intact.      Conjunctiva/sclera: Conjunctivae normal.      Pupils: Pupils are equal, round, and reactive to light.   Pulmonary:      Effort: Pulmonary effort is normal.   Neurological:      Mental Status: She is alert and oriented to person, place, and time.      Cranial Nerves: No cranial nerve deficit.      Gait: Gait normal.   Psychiatric:         Mood and Affect: Mood and affect normal.         Assessment:       1. Diabetes mellitus type 2, uncontrolled, with complications    2. Hyperlipidemia associated with type 2 diabetes mellitus    3. Hypertension associated with diabetes    4. Statin intolerance    5. Vitamin B12 deficiency    6. Vitamin D deficiency    7. Need for hepatitis C screening test    8. Depression with anxiety        Plan:     Problem List Items Addressed This Visit     Depression with anxiety    Current Assessment & Plan     Has not scheduled to establish with Psychiatry, will have staff assist with scheduling appointment, agree to provide temporary refills if she runs out prior to her  appointment with psychiatry         Diabetes mellitus type 2, uncontrolled, with complications - Primary    Current Assessment & Plan     A1c 8.0; we will add metformin 500 mg daily with breakfast; will reassess in 3 months         Relevant Medications    metFORMIN (GLUCOPHAGE) 500 MG tablet    Other Relevant Orders    Hemoglobin A1C    Hemoglobin A1C    Hyperlipidemia associated with type 2 diabetes mellitus    Current Assessment & Plan     LDL 85; reasonable for age and given that she is unable to tolerate statins; continue Zetia         Relevant Medications    metFORMIN (GLUCOPHAGE) 500 MG tablet    Other Relevant Orders    Comprehensive metabolic panel    Lipid Panel    Hypertension associated with diabetes    Current Assessment & Plan     Controlled, continue losartan 100 mg daily         Relevant Medications    metFORMIN (GLUCOPHAGE) 500 MG tablet    Statin intolerance    Vitamin B12 deficiency    Current Assessment & Plan     B12 level low; resume B12 supplement         Relevant Medications    cyanocobalamin (VITAMIN B-12) 1000 MCG tablet    Vitamin D deficiency    Current Assessment & Plan     Stable, continue supplement         Relevant Orders    Vitamin D      Other Visit Diagnoses     Need for hepatitis C screening test        Relevant Orders    Hepatitis C Antibody          Health Maintenance reviewed/updated. Advised to obtain flu vaccine this Fall. Discussed shingrix vaccine, given informational handout, advised can be obtained at pharmacy.

## 2020-08-18 NOTE — ASSESSMENT & PLAN NOTE
Has not scheduled to establish with Psychiatry, will have staff assist with scheduling appointment, agree to provide temporary refills if she runs out prior to her appointment with psychiatry

## 2020-09-25 DIAGNOSIS — K21.9 GASTROESOPHAGEAL REFLUX DISEASE, ESOPHAGITIS PRESENCE NOT SPECIFIED: ICD-10-CM

## 2020-09-25 RX ORDER — FAMOTIDINE 20 MG/1
20 TABLET, FILM COATED ORAL 2 TIMES DAILY
Qty: 60 TABLET | Refills: 5 | Status: SHIPPED | OUTPATIENT
Start: 2020-09-25 | End: 2021-07-23

## 2021-07-27 ENCOUNTER — PATIENT OUTREACH (OUTPATIENT)
Dept: ADMINISTRATIVE | Facility: OTHER | Age: 81
End: 2021-07-27

## 2021-09-15 ENCOUNTER — TELEPHONE (OUTPATIENT)
Dept: INTERNAL MEDICINE | Facility: CLINIC | Age: 81
End: 2021-09-15
